# Patient Record
Sex: FEMALE | Race: WHITE | Employment: OTHER | URBAN - METROPOLITAN AREA
[De-identification: names, ages, dates, MRNs, and addresses within clinical notes are randomized per-mention and may not be internally consistent; named-entity substitution may affect disease eponyms.]

---

## 2017-05-15 ENCOUNTER — TELEPHONE (OUTPATIENT)
Dept: INTERNAL MEDICINE CLINIC | Age: 72
End: 2017-05-15

## 2017-09-29 ENCOUNTER — TELEPHONE (OUTPATIENT)
Dept: INTERNAL MEDICINE CLINIC | Age: 72
End: 2017-09-29

## 2017-09-29 DIAGNOSIS — M85.80 OSTEOPENIA DETERMINED BY X-RAY: ICD-10-CM

## 2017-09-29 DIAGNOSIS — E78.00 HYPERCHOLESTEROLEMIA: Primary | Chronic | ICD-10-CM

## 2017-10-09 DIAGNOSIS — M85.80 OSTEOPENIA DETERMINED BY X-RAY: ICD-10-CM

## 2017-10-09 DIAGNOSIS — E78.00 HYPERCHOLESTEROLEMIA: Chronic | ICD-10-CM

## 2017-10-09 LAB
A/G RATIO: 1.5 (ref 1.1–2.2)
ALBUMIN SERPL-MCNC: 4.1 G/DL (ref 3.4–5)
ALP BLD-CCNC: 125 U/L (ref 40–129)
ALT SERPL-CCNC: 31 U/L (ref 10–40)
ANION GAP SERPL CALCULATED.3IONS-SCNC: 16 MMOL/L (ref 3–16)
AST SERPL-CCNC: 20 U/L (ref 15–37)
BASOPHILS ABSOLUTE: 0.1 K/UL (ref 0–0.2)
BASOPHILS RELATIVE PERCENT: 0.8 %
BILIRUB SERPL-MCNC: 0.5 MG/DL (ref 0–1)
BUN BLDV-MCNC: 18 MG/DL (ref 7–20)
CALCIUM SERPL-MCNC: 9.8 MG/DL (ref 8.3–10.6)
CHLORIDE BLD-SCNC: 103 MMOL/L (ref 99–110)
CHOLESTEROL, TOTAL: 183 MG/DL (ref 0–199)
CO2: 23 MMOL/L (ref 21–32)
CREAT SERPL-MCNC: 0.7 MG/DL (ref 0.6–1.2)
EOSINOPHILS ABSOLUTE: 0.2 K/UL (ref 0–0.6)
EOSINOPHILS RELATIVE PERCENT: 2.8 %
GFR AFRICAN AMERICAN: >60
GFR NON-AFRICAN AMERICAN: >60
GLOBULIN: 2.7 G/DL
GLUCOSE BLD-MCNC: 90 MG/DL (ref 70–99)
HCT VFR BLD CALC: 44 % (ref 36–48)
HDLC SERPL-MCNC: 55 MG/DL (ref 40–60)
HEMOGLOBIN: 14.5 G/DL (ref 12–16)
LDL CHOLESTEROL CALCULATED: 96 MG/DL
LYMPHOCYTES ABSOLUTE: 1.8 K/UL (ref 1–5.1)
LYMPHOCYTES RELATIVE PERCENT: 25.2 %
MCH RBC QN AUTO: 29.4 PG (ref 26–34)
MCHC RBC AUTO-ENTMCNC: 33 G/DL (ref 31–36)
MCV RBC AUTO: 89.2 FL (ref 80–100)
MONOCYTES ABSOLUTE: 0.6 K/UL (ref 0–1.3)
MONOCYTES RELATIVE PERCENT: 8.4 %
NEUTROPHILS ABSOLUTE: 4.5 K/UL (ref 1.7–7.7)
NEUTROPHILS RELATIVE PERCENT: 62.8 %
PDW BLD-RTO: 14.4 % (ref 12.4–15.4)
PLATELET # BLD: 161 K/UL (ref 135–450)
PMV BLD AUTO: 10.9 FL (ref 5–10.5)
POTASSIUM SERPL-SCNC: 4.4 MMOL/L (ref 3.5–5.1)
RBC # BLD: 4.93 M/UL (ref 4–5.2)
SODIUM BLD-SCNC: 142 MMOL/L (ref 136–145)
TOTAL PROTEIN: 6.8 G/DL (ref 6.4–8.2)
TRIGL SERPL-MCNC: 160 MG/DL (ref 0–150)
TSH SERPL DL<=0.05 MIU/L-ACNC: 0.66 UIU/ML (ref 0.27–4.2)
VITAMIN D 25-HYDROXY: 30.4 NG/ML
VLDLC SERPL CALC-MCNC: 32 MG/DL
WBC # BLD: 7.2 K/UL (ref 4–11)

## 2017-10-12 ENCOUNTER — TELEPHONE (OUTPATIENT)
Dept: INTERNAL MEDICINE CLINIC | Age: 72
End: 2017-10-12

## 2017-10-12 ENCOUNTER — OFFICE VISIT (OUTPATIENT)
Dept: INTERNAL MEDICINE CLINIC | Age: 72
End: 2017-10-12

## 2017-10-12 VITALS
SYSTOLIC BLOOD PRESSURE: 130 MMHG | BODY MASS INDEX: 28.42 KG/M2 | HEIGHT: 63 IN | WEIGHT: 160.4 LBS | OXYGEN SATURATION: 97 % | DIASTOLIC BLOOD PRESSURE: 80 MMHG | TEMPERATURE: 98.1 F | HEART RATE: 79 BPM

## 2017-10-12 DIAGNOSIS — E78.00 HYPERCHOLESTEROLEMIA: Chronic | ICD-10-CM

## 2017-10-12 DIAGNOSIS — M85.89 OSTEOPENIA OF MULTIPLE SITES: ICD-10-CM

## 2017-10-12 DIAGNOSIS — R09.89 GLOBUS SENSATION: ICD-10-CM

## 2017-10-12 DIAGNOSIS — C50.911 MALIGNANT NEOPLASM OF RIGHT FEMALE BREAST, UNSPECIFIED SITE OF BREAST: Primary | ICD-10-CM

## 2017-10-12 DIAGNOSIS — Z23 NEED FOR PROPHYLACTIC VACCINATION WITH STREPTOCOCCUS PNEUMONIAE (PNEUMOCOCCUS) AND INFLUENZA VACCINES: ICD-10-CM

## 2017-10-12 PROCEDURE — 90732 PPSV23 VACC 2 YRS+ SUBQ/IM: CPT | Performed by: INTERNAL MEDICINE

## 2017-10-12 PROCEDURE — 99214 OFFICE O/P EST MOD 30 MIN: CPT | Performed by: INTERNAL MEDICINE

## 2017-10-12 PROCEDURE — G0009 ADMIN PNEUMOCOCCAL VACCINE: HCPCS | Performed by: INTERNAL MEDICINE

## 2017-10-12 ASSESSMENT — ENCOUNTER SYMPTOMS
WHEEZING: 0
STRIDOR: 0
VOMITING: 0
RESPIRATORY NEGATIVE: 1
HEARTBURN: 0
EYES NEGATIVE: 1
EYE REDNESS: 0
BLOOD IN STOOL: 0
EYE PAIN: 0
DOUBLE VISION: 0
SHORTNESS OF BREATH: 0
COUGH: 0
ORTHOPNEA: 0
BACK PAIN: 0
NAUSEA: 0
ABDOMINAL PAIN: 0
SORE THROAT: 0
DIARRHEA: 0
BLURRED VISION: 0
CONSTIPATION: 0
EYE DISCHARGE: 0
PHOTOPHOBIA: 0
HEMOPTYSIS: 0
SPUTUM PRODUCTION: 0

## 2017-10-12 ASSESSMENT — PATIENT HEALTH QUESTIONNAIRE - PHQ9
2. FEELING DOWN, DEPRESSED OR HOPELESS: 0
SUM OF ALL RESPONSES TO PHQ QUESTIONS 1-9: 0
SUM OF ALL RESPONSES TO PHQ9 QUESTIONS 1 & 2: 0
1. LITTLE INTEREST OR PLEASURE IN DOING THINGS: 0

## 2017-10-12 NOTE — PROGRESS NOTES
OUTPATIENT PROGRESS NOTE    Date of Service:  10/12/2017  Address: [address] [place] [address] INTERNAL MEDICINE  [address] [address]  Dept: [phone]    Subjective:      Patient ID: [id number]  Briana Cook is a 67 y.o. 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 Less so than last year     Had her colonoscopy with Dr. Tobias Santamaria and was having difficulty with the prep, advised next in 2022, will use zofran to premedicate     Has gotten pneumococcal vaccine 2009, never had prevnar. Had her zostavax since last visit as she was not allergic to neosporin. She vacationed to [place], [place] and [place] for vacation recently. Review of Systems   Constitutional: Negative. Negative for chills, diaphoresis, fever, malaise/fatigue and weight loss. HENT: Negative for congestion, ear discharge, ear pain, hearing loss, nosebleeds, sore throat and tinnitus. Eyes: Negative. Negative for blurred vision, double vision, photophobia, pain, discharge and redness. Respiratory: Negative. Negative for cough, hemoptysis, sputum production, shortness of breath, wheezing and stridor. Cardiovascular: Negative. Negative for chest pain, palpitations, orthopnea, claudication, leg swelling and PND. Gastrointestinal: Negative for abdominal pain, blood in stool, constipation, diarrhea, heartburn, melena, nausea and vomiting. Genitourinary: Negative. Negative for dysuria, flank pain, frequency, hematuria and urgency. Musculoskeletal: Negative. Negative for back pain, falls, joint pain, myalgias and neck pain. Skin: Negative. Negative for itching and rash.    Neurological: Negative for dizziness, tingling, distal pulses. Exam reveals no gallop and no friction rub. No murmur heard. Pulmonary/Chest: Effort normal and breath sounds normal. No stridor. No respiratory distress. She has no wheezes. She has no rales. She exhibits no tenderness. Abdominal: Soft. Bowel sounds are normal. She exhibits no distension and no mass. There is tenderness in the epigastric area. There is no rebound and no guarding. Musculoskeletal: Normal range of motion. She exhibits no edema or tenderness. Lymphadenopathy:     She has no cervical adenopathy. Neurological: She is oriented to person, place, and time. She has normal reflexes. She appears not lethargic. No cranial nerve deficit. She exhibits normal muscle tone. Gait normal. Coordination normal.   Skin: Skin is warm and dry. No rash noted. She is not diaphoretic. No erythema. No pallor. Psychiatric: Mood, memory, affect and judgment normal.   Nursing note and vitals reviewed. Assessment/Plan:      Encounter Diagnoses   Name Primary?  Malignant neoplasm of right female breast, unspecified site of breast ([facility] [address] Yes    Hypercholesterolemia     Osteopenia of multiple sites     Globus sensation        1. Malignant neoplasm of right female breast, unspecified site of breast ([address]  Normal mammogram. 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 Hypercholesterolemia  Ongoing, on Lipitor 10mg every other day, recent lipids great. Will repeat in a year. ASCVD calculation per results     3. Elevation of level of transaminase or lactic acid dehydrogenase (LDH)  Previously noted, likely ALP elevated due to bone demineralization    4. Osteopenia  Needs updated DEXA in february and vitamin d was normal for post-menopausal estrogen deficient osteopenia. Noted with elevated ALP previously    5.  Nasal congestion  Likely allergic vs non-allergic rhinitis, will trial nasal CS and try ipratropium nasal if no improvement          Additional Orders:      Orders Placed This Encounter   Procedures    CANCER ANTIGEN 27.29     Standing Status:   Future     Standing Expiration Date:   10/12/2018     No orders of the defined types were placed in this encounter. DISPOSITION:      No Follow-up on file.   1. Greater than 45 minutes spent with patient and significant other and >20 minutes on medication dosing, use and lifestyle modifications, home safety    Kang You MD

## 2017-10-13 DIAGNOSIS — C50.911 MALIGNANT NEOPLASM OF RIGHT FEMALE BREAST, UNSPECIFIED SITE OF BREAST: ICD-10-CM

## 2017-10-18 LAB — CA 27-29: 12 U/ML (ref 0–38)

## 2018-01-25 DIAGNOSIS — E78.00 HYPERCHOLESTEROLEMIA: Chronic | ICD-10-CM

## 2018-01-25 RX ORDER — ATORVASTATIN CALCIUM 10 MG/1
10 TABLET, FILM COATED ORAL EVERY OTHER DAY
Qty: 45 TABLET | Refills: 0 | Status: SHIPPED | OUTPATIENT
Start: 2018-01-25 | End: 2018-05-11 | Stop reason: SDUPTHER

## 2018-05-11 ENCOUNTER — OFFICE VISIT (OUTPATIENT)
Dept: INTERNAL MEDICINE CLINIC | Age: 73
End: 2018-05-11

## 2018-05-11 VITALS
BODY MASS INDEX: 26.87 KG/M2 | WEIGHT: 157.4 LBS | HEART RATE: 81 BPM | SYSTOLIC BLOOD PRESSURE: 134 MMHG | HEIGHT: 64 IN | TEMPERATURE: 97.9 F | DIASTOLIC BLOOD PRESSURE: 80 MMHG | OXYGEN SATURATION: 96 %

## 2018-05-11 DIAGNOSIS — E78.00 HYPERCHOLESTEROLEMIA: Primary | Chronic | ICD-10-CM

## 2018-05-11 DIAGNOSIS — Z00.00 ANNUAL PHYSICAL EXAM: ICD-10-CM

## 2018-05-11 DIAGNOSIS — C50.011 MALIGNANT NEOPLASM OF NIPPLE OF RIGHT BREAST IN FEMALE, UNSPECIFIED ESTROGEN RECEPTOR STATUS (HCC): ICD-10-CM

## 2018-05-11 DIAGNOSIS — E55.9 VITAMIN D DEFICIENCY: ICD-10-CM

## 2018-05-11 DIAGNOSIS — M85.80 OSTEOPENIA, UNSPECIFIED LOCATION: ICD-10-CM

## 2018-05-11 PROCEDURE — 4040F PNEUMOC VAC/ADMIN/RCVD: CPT | Performed by: INTERNAL MEDICINE

## 2018-05-11 PROCEDURE — G8427 DOCREV CUR MEDS BY ELIG CLIN: HCPCS | Performed by: INTERNAL MEDICINE

## 2018-05-11 PROCEDURE — 3017F COLORECTAL CA SCREEN DOC REV: CPT | Performed by: INTERNAL MEDICINE

## 2018-05-11 PROCEDURE — G8419 CALC BMI OUT NRM PARAM NOF/U: HCPCS | Performed by: INTERNAL MEDICINE

## 2018-05-11 PROCEDURE — G8399 PT W/DXA RESULTS DOCUMENT: HCPCS | Performed by: INTERNAL MEDICINE

## 2018-05-11 PROCEDURE — 3014F SCREEN MAMMO DOC REV: CPT | Performed by: INTERNAL MEDICINE

## 2018-05-11 PROCEDURE — 1036F TOBACCO NON-USER: CPT | Performed by: INTERNAL MEDICINE

## 2018-05-11 PROCEDURE — 1123F ACP DISCUSS/DSCN MKR DOCD: CPT | Performed by: INTERNAL MEDICINE

## 2018-05-11 PROCEDURE — 1090F PRES/ABSN URINE INCON ASSESS: CPT | Performed by: INTERNAL MEDICINE

## 2018-05-11 PROCEDURE — 99214 OFFICE O/P EST MOD 30 MIN: CPT | Performed by: INTERNAL MEDICINE

## 2018-05-11 RX ORDER — ATORVASTATIN CALCIUM 10 MG/1
10 TABLET, FILM COATED ORAL EVERY OTHER DAY
Qty: 45 TABLET | Refills: 3 | Status: SHIPPED | OUTPATIENT
Start: 2018-05-11 | End: 2018-11-13 | Stop reason: SDUPTHER

## 2018-11-07 DIAGNOSIS — C50.011 MALIGNANT NEOPLASM OF NIPPLE OF RIGHT BREAST IN FEMALE, UNSPECIFIED ESTROGEN RECEPTOR STATUS (HCC): ICD-10-CM

## 2018-11-07 DIAGNOSIS — E55.9 VITAMIN D DEFICIENCY: ICD-10-CM

## 2018-11-07 DIAGNOSIS — Z00.00 ANNUAL PHYSICAL EXAM: ICD-10-CM

## 2018-11-07 LAB
A/G RATIO: 1.8 (ref 1.1–2.2)
ALBUMIN SERPL-MCNC: 4.5 G/DL (ref 3.4–5)
ALP BLD-CCNC: 125 U/L (ref 40–129)
ALT SERPL-CCNC: 25 U/L (ref 10–40)
ANION GAP SERPL CALCULATED.3IONS-SCNC: 13 MMOL/L (ref 3–16)
AST SERPL-CCNC: 24 U/L (ref 15–37)
BASOPHILS ABSOLUTE: 0 K/UL (ref 0–0.2)
BASOPHILS RELATIVE PERCENT: 0.7 %
BILIRUB SERPL-MCNC: 0.5 MG/DL (ref 0–1)
BUN BLDV-MCNC: 18 MG/DL (ref 7–20)
CALCIUM SERPL-MCNC: 9.6 MG/DL (ref 8.3–10.6)
CHLORIDE BLD-SCNC: 104 MMOL/L (ref 99–110)
CHOLESTEROL, TOTAL: 180 MG/DL (ref 0–199)
CO2: 26 MMOL/L (ref 21–32)
CREAT SERPL-MCNC: 0.7 MG/DL (ref 0.6–1.2)
EOSINOPHILS ABSOLUTE: 0.2 K/UL (ref 0–0.6)
EOSINOPHILS RELATIVE PERCENT: 2.5 %
GFR AFRICAN AMERICAN: >60
GFR NON-AFRICAN AMERICAN: >60
GLOBULIN: 2.5 G/DL
GLUCOSE BLD-MCNC: 89 MG/DL (ref 70–99)
HCT VFR BLD CALC: 44.7 % (ref 36–48)
HDLC SERPL-MCNC: 56 MG/DL (ref 40–60)
HEMOGLOBIN: 14.7 G/DL (ref 12–16)
LDL CHOLESTEROL CALCULATED: 98 MG/DL
LYMPHOCYTES ABSOLUTE: 1.9 K/UL (ref 1–5.1)
LYMPHOCYTES RELATIVE PERCENT: 30.1 %
MCH RBC QN AUTO: 29.6 PG (ref 26–34)
MCHC RBC AUTO-ENTMCNC: 32.9 G/DL (ref 31–36)
MCV RBC AUTO: 89.9 FL (ref 80–100)
MONOCYTES ABSOLUTE: 0.5 K/UL (ref 0–1.3)
MONOCYTES RELATIVE PERCENT: 8.4 %
NEUTROPHILS ABSOLUTE: 3.6 K/UL (ref 1.7–7.7)
NEUTROPHILS RELATIVE PERCENT: 58.3 %
PDW BLD-RTO: 14.1 % (ref 12.4–15.4)
PLATELET # BLD: 195 K/UL (ref 135–450)
PMV BLD AUTO: 10.5 FL (ref 5–10.5)
POTASSIUM SERPL-SCNC: 4.4 MMOL/L (ref 3.5–5.1)
RBC # BLD: 4.98 M/UL (ref 4–5.2)
SODIUM BLD-SCNC: 143 MMOL/L (ref 136–145)
TOTAL PROTEIN: 7 G/DL (ref 6.4–8.2)
TRIGL SERPL-MCNC: 129 MG/DL (ref 0–150)
TSH SERPL DL<=0.05 MIU/L-ACNC: 0.9 UIU/ML (ref 0.27–4.2)
VITAMIN B-12: 467 PG/ML (ref 211–911)
VITAMIN D 25-HYDROXY: 37.8 NG/ML
VLDLC SERPL CALC-MCNC: 26 MG/DL
WBC # BLD: 6.2 K/UL (ref 4–11)

## 2018-11-14 ENCOUNTER — TELEPHONE (OUTPATIENT)
Dept: FAMILY MEDICINE CLINIC | Age: 73
End: 2018-11-14

## 2018-11-14 LAB — CA 27-29: 7 U/ML (ref 0–38)

## 2018-11-15 NOTE — TELEPHONE ENCOUNTER
PA RESPONSE: Diclofenac Sodium 1% TD GEL  HAS BEEN APPROVED  GOOD UNTIL 12/31/2019
PA SUBMITTED VIA CM FOR Diclofenac Sodium 1% TD GEL  Key: [id number] - PA Case ID: [id number]   STATUS: PENDING
English

## 2019-02-12 DIAGNOSIS — E78.00 HYPERCHOLESTEROLEMIA: Chronic | ICD-10-CM

## 2019-02-12 LAB
ALBUMIN SERPL-MCNC: 4 G/DL (ref 3.4–5)
ALP BLD-CCNC: 128 U/L (ref 40–129)
ALT SERPL-CCNC: 18 U/L (ref 10–40)
AST SERPL-CCNC: 17 U/L (ref 15–37)
BILIRUB SERPL-MCNC: 0.6 MG/DL (ref 0–1)
BILIRUBIN DIRECT: <0.2 MG/DL (ref 0–0.3)
BILIRUBIN, INDIRECT: NORMAL MG/DL (ref 0–1)
CHOLESTEROL, TOTAL: 154 MG/DL (ref 0–199)
HDLC SERPL-MCNC: 57 MG/DL (ref 40–60)
LDL CHOLESTEROL CALCULATED: 68 MG/DL
TOTAL PROTEIN: 6.9 G/DL (ref 6.4–8.2)
TRIGL SERPL-MCNC: 147 MG/DL (ref 0–150)
VLDLC SERPL CALC-MCNC: 29 MG/DL

## 2019-11-07 ENCOUNTER — TELEPHONE (OUTPATIENT)
Dept: PHARMACY | Facility: CLINIC | Age: 74
End: 2019-11-07

## 2020-04-05 PROBLEM — Z85.828 HISTORY OF NONMELANOMA SKIN CANCER: Status: ACTIVE | Noted: 2019-01-11

## 2020-06-11 ENCOUNTER — HOSPITAL ENCOUNTER (OUTPATIENT)
Dept: WOMENS IMAGING | Age: 75
Discharge: HOME | End: 2020-06-11
Payer: MEDICARE

## 2020-06-11 PROCEDURE — 77080 DXA BONE DENSITY AXIAL: CPT

## 2020-06-25 ENCOUNTER — HOSPITAL ENCOUNTER (OUTPATIENT)
Dept: WOMENS IMAGING | Age: 75
Discharge: HOME | End: 2020-06-25
Payer: MEDICARE

## 2020-06-25 PROCEDURE — 77063 BREAST TOMOSYNTHESIS BI: CPT

## 2020-11-09 PROBLEM — R07.0 CHRONIC THROAT PAIN: Status: ACTIVE | Noted: 2020-01-01

## 2020-11-09 PROBLEM — G89.29 CHRONIC THROAT PAIN: Status: ACTIVE | Noted: 2020-01-01

## 2021-09-02 ENCOUNTER — HOSPITAL ENCOUNTER (OUTPATIENT)
Dept: WOMENS IMAGING | Age: 76
Discharge: HOME | End: 2021-09-02
Payer: MEDICARE

## 2021-09-02 DIAGNOSIS — Z12.31 VISIT FOR SCREENING MAMMOGRAM: ICD-10-CM

## 2021-09-02 PROCEDURE — 77063 BREAST TOMOSYNTHESIS BI: CPT

## 2022-04-01 DIAGNOSIS — H91.90 HEARING LOSS, UNSPECIFIED HEARING LOSS TYPE, UNSPECIFIED LATERALITY: Primary | ICD-10-CM

## 2022-04-05 ENCOUNTER — PROCEDURE VISIT (OUTPATIENT)
Dept: AUDIOLOGY | Age: 77
End: 2022-04-05
Payer: MEDICARE

## 2022-04-05 ENCOUNTER — OFFICE VISIT (OUTPATIENT)
Dept: ENT CLINIC | Age: 77
End: 2022-04-05
Payer: MEDICARE

## 2022-04-05 VITALS
BODY MASS INDEX: 25.78 KG/M2 | HEIGHT: 64 IN | WEIGHT: 151 LBS | DIASTOLIC BLOOD PRESSURE: 94 MMHG | HEART RATE: 81 BPM | SYSTOLIC BLOOD PRESSURE: 165 MMHG

## 2022-04-05 DIAGNOSIS — H93.13 TINNITUS OF BOTH EARS: ICD-10-CM

## 2022-04-05 DIAGNOSIS — H90.3 SENSORINEURAL HEARING LOSS (SNHL) OF BOTH EARS: Primary | ICD-10-CM

## 2022-04-05 DIAGNOSIS — R49.0 DYSPHONIA: ICD-10-CM

## 2022-04-05 DIAGNOSIS — H93.8X3 SENSATION OF FULLNESS IN BOTH EARS: ICD-10-CM

## 2022-04-05 DIAGNOSIS — R04.0 EPISTAXIS: ICD-10-CM

## 2022-04-05 DIAGNOSIS — H69.82 DYSFUNCTION OF LEFT EUSTACHIAN TUBE: ICD-10-CM

## 2022-04-05 PROCEDURE — 1123F ACP DISCUSS/DSCN MKR DOCD: CPT | Performed by: STUDENT IN AN ORGANIZED HEALTH CARE EDUCATION/TRAINING PROGRAM

## 2022-04-05 PROCEDURE — G8427 DOCREV CUR MEDS BY ELIG CLIN: HCPCS | Performed by: STUDENT IN AN ORGANIZED HEALTH CARE EDUCATION/TRAINING PROGRAM

## 2022-04-05 PROCEDURE — 92504 EAR MICROSCOPY EXAMINATION: CPT | Performed by: STUDENT IN AN ORGANIZED HEALTH CARE EDUCATION/TRAINING PROGRAM

## 2022-04-05 PROCEDURE — 99204 OFFICE O/P NEW MOD 45 MIN: CPT | Performed by: STUDENT IN AN ORGANIZED HEALTH CARE EDUCATION/TRAINING PROGRAM

## 2022-04-05 PROCEDURE — 4040F PNEUMOC VAC/ADMIN/RCVD: CPT | Performed by: STUDENT IN AN ORGANIZED HEALTH CARE EDUCATION/TRAINING PROGRAM

## 2022-04-05 PROCEDURE — G8417 CALC BMI ABV UP PARAM F/U: HCPCS | Performed by: STUDENT IN AN ORGANIZED HEALTH CARE EDUCATION/TRAINING PROGRAM

## 2022-04-05 PROCEDURE — 92557 COMPREHENSIVE HEARING TEST: CPT | Performed by: AUDIOLOGIST

## 2022-04-05 PROCEDURE — 1036F TOBACCO NON-USER: CPT | Performed by: STUDENT IN AN ORGANIZED HEALTH CARE EDUCATION/TRAINING PROGRAM

## 2022-04-05 PROCEDURE — 92567 TYMPANOMETRY: CPT | Performed by: AUDIOLOGIST

## 2022-04-05 PROCEDURE — 1090F PRES/ABSN URINE INCON ASSESS: CPT | Performed by: STUDENT IN AN ORGANIZED HEALTH CARE EDUCATION/TRAINING PROGRAM

## 2022-04-05 PROCEDURE — G8399 PT W/DXA RESULTS DOCUMENT: HCPCS | Performed by: STUDENT IN AN ORGANIZED HEALTH CARE EDUCATION/TRAINING PROGRAM

## 2022-04-05 NOTE — Clinical Note
Dr. Leonides Gonzales,    Please see note from this patient's audiogram from today. Please let me know if there is anything further you need.         Obed Andujar  Audiologist

## 2022-04-05 NOTE — PATIENT INSTRUCTIONS
Good Communication Strategies    Communication can be challenging for anyone, but can be especially difficult for those with some degree of hearing loss. While we may not be able to control every factor that may lead to difficulty with communication, there are Good Communication Strategies that we can all use in our day-to-day lives. Communication takes both parties working together for it to be successful. Tips as a Listener:   1. Control your environment. It is important to limit the amount of background noise in the room when possible. You should also consider having a good light source in the room to best see the other person. 2. Ask for clarification. Instead of saying \"What?\", you can use parts of what you heard to make a new question. For example, if you heard the word \"Thursday\" but not the rest of the week, you may ask \"What was that about Thursday? \" or \"What did you want to do Thursday? \". This shows the person talking that you are listening and will help them better explain what they are saying. 3. Be an advocate for yourself. If you are hearing but not understanding, tell the other person \"I can hear you, but I need you to slow down when you speak. \"  Or if someone is facing the other direction, say \"I cannot hear you when you are not looking at me when we talk. \"       Tips as a Talker:   - Sit or stand 3 to 6 feet away to maximize audibility         -- It is unrealistic to believe someone else will fully hear your message if you are speaking from across the room or in a different room in the house   - Stay at eye level to help with visual cues   - Make sure you have the persons attention before speaking   - Use facial expressions and gestures to accentuate your message   - Raise your voice slightly (do not scream)   - Speak slowly and distinctly   - Use short, simple sentences   - Rephrase your words if the person is having a hard time understanding you    - To avoid distortion, dont speak directly into a persons ear      Some additional items that may be helpful:   - Remain patient - this is important for both parties   - Write down items that still cannot be heard/understood. You may write with pen/paper or consider typing/texting on a cell phone or smart device. - If background noise is unavoidable, try to keep yourself in a good position in the room. By sitting at a cool on the side of the restaurant (preferably a corner), it will be easier to communicate than if you were sitting at a table in the middle with background noise surrounding you. Keep yourself positioned away from music speakers or heavy foot traffic. Hearing Loss: Care Instructions  Your Care Instructions      Hearing loss is a sudden or slow decrease in how well you hear. It can range from mild to profound. Permanent hearing loss can occur with aging, and it can happen when you are exposed long-term to loud noise. Examples include listening to loud music, riding motorcycles, or being around other loud machines. Hearing loss can affect your work and home life. It can make you feel lonely or depressed. You may feel that you have lost your independence. But hearing aids and other devices can help you hear better and feel connected to others. Follow-up care is a key part of your treatment and safety. Be sure to make and go to all appointments, and call your doctor if you are having problems. It's also a good idea to know your test results and keep a list of the medicines you take. How can you care for yourself at home? · Avoid loud noises whenever possible. This helps keep your hearing from getting worse. Always wear hearing protection around loud noises. · If appropriate, wear hearing aid(s) as directed. It is recommended that hearing aids are worn during all waking hours to keep your brain active and give it access to the sounds it is missing.       · If you are beginning your process with hearing aid(s), schedule a \"Hearing Aid Evaluation\" with an audiologist to discuss your lifestyle, features of hearing aid technology, and styles of hearing aids available. It is recommended that you contact your insurance company to determine if you have a hearing aid benefit, as this may dictate who you can see for these services. · Have hearing tests as your doctor suggests. They can show whether your hearing has changed. Your hearing aid may need to be adjusted. · Use other assistive devices as needed. These may include:  ? Telephone amplifiers and hearing aids that can connect to a television, stereo, radio, or microphone. ? Devices that use lights or vibrations. These alert you to the doorbell, a ringing telephone, or a baby monitor. ? Television closed-captioning. This shows the words at the bottom of the screen. Most new TVs can do this. ? TTY (text telephone). This lets you type messages back and forth on the telephone instead of talking or listening. These devices are also called TDD. When messages are typed on the keyboard, they are sent over the phone line to a receiving TTY. The message is shown on a monitor. · Use pagers, fax machines, text, and email if it is hard for you to communicate by telephone. · Try to learn a listening technique called speech-reading. It is not lip-reading. You pay attention to people's gestures, expressions, posture, and tone of voice. These clues can help you understand what a person is saying. Face the person you are talking to, and have him or her face you. Make sure the lighting is good. You need to see the other person's face clearly. · Think about counseling if you need help to adjust to your hearing loss. When should you call for help? Watch closely for changes in your health, and be sure to contact your doctor if:    · You think your hearing is getting worse. · You have new symptoms, such as dizziness or nausea.            Tinnitus: Overview and Management Strategies          Many people have some ringing sounds in their ears once in a while. You may hear a roar, a hiss, a tinkle, or a buzz. The sound usually lasts only a few minutes. If it goes on all the time, you may have tinnitus. Tinnitus is usually caused by long-term exposure to loud noise. This damages the nerves in the inner ear. It can occur with all types of hearing loss. It may be a symptom of almost any ear problem. Tinnitus may be caused by a buildup of earwax. Or, it may be caused by ear infections or certain medicines (especially antibiotics or large amounts of aspirin). You can also hear noises in your ears because of an injury to the ears, drinking too much alcohol or caffeine, or a medical condition. Other conditions may also contribute to tinnitus, including: head and neck trauma, temporomandibular joint disorder (TMJ), sinus pressure and barometric trauma, traumatic brain injury, metabolic disorders, autoimmune disorders, stress, and high blood pressure. You may need tests to evaluate your hearing and to find causes of long-lasting tinnitus. Your doctor may suggest one or more treatments to help you cope with the tinnitus. You can also do things at home to help reduce symptoms. Causes of Tinnitus  We do not know the exact cause of tinnitus. One thing we do know is that you are not imagining it. If you have tinnitus, chances are the cause will remain a mystery. Conditions that might cause tinnitus include the following:    Hearing loss    Ménière's disease    Loud noise exposure    Migraines    Head injury    Drugs or medicines that are toxic to hearing    Anemia    High blood pressure    Stress    A lot of wax in the ear    Certain types of tumors    Having a lot of caffeine    Smoking cigarettes  You may find that your tinnitus is worse at night. This happens because it is quiet and you are not distracted.  Feeling tired and stressed may make your tinnitus worse.    Follow-up care is a key part of your treatment and safety. Be sure to make and go to all appointments, and call your doctor if you are having problems. It's also a good idea to know your test results and keep a list of the medicines you take. How can you care for yourself at home? · Limit or cut out alcohol, caffeine, and sodium. They can make your symptoms worse. · Do not smoke or use other tobacco products. Nicotine reduces blood flow to the ear and makes tinnitus worse. If you need help quitting, talk to your doctor about stop-smoking programs and medicines. These can increase your chances of quitting for good. · Talk to your doctor about whether to stop taking aspirin and similar products such as ibuprofen or naproxen. · Get exercise often. It can help improve blood flow to the ear. Hearing Aids and Other Devices  A hearing aid may help your tinnitus if you have a hearing loss. An audiologist can help you find and use the best hearing aid for you. Tinnitus maskers look like hearing aids. They make a sound that masks, or covers up, the tinnitus. The masking sound distracts you from the ringing in your ears. You may be able to use a masker and a hearing aid at the same time. Sound machines can be useful at night or during quiet times. There are machines you can buy at the store. Or, you can find apps on your phone that make sounds, like the ocean or rainfall. Fish tanks, fans, quiet music, and indoor waterfalls can help, as well. Ways to manage/cope with tinnitus  Some tinnitus may last a long time. To manage your tinnitus, try to:  · Avoid noises that you think caused your tinnitus. If you can't avoid loud noises, wear earplugs or earmuffs. · Ignore the sound by paying attention to other things. Keeping your brain busy with other tasks or background noise can help your brain not focus on the tinnitus. · Try to not give the tinnitus an emotional reaction.   Do your best to ignore the sound and not let it bother you. Relax using biofeedback, meditation, or yoga. Feeling stressed and being tired can make tinnitus worse. · Play music or white noise to help you sleep. Background noise may cover up the noise that you hear in your ears. You can buy a tabletop machine or a device that sits under your pillow to play soothing sounds, like ocean waves. · Smart phones have free apps, such as Whist, Relax Melodies, ReSound Relief, and White Noise Lite. These apps have different types of sounds/noise, some of which you can blend together to find sounds that are most soothing to you. · Hearing aid technology, especially when there is some hearing loss, may help reduce tinnitus symptoms by giving your brain better access to the sounds it is missing. There are some hearing aids with built-in noise generator programs, which may help when amplification alone is not enough. Additional resources may be found through the [employer] Tinn[employer] Association at www.nidia.org    When should you call for help? Call 911 anytime you think you may need emergency care. For example, call if:    · You have symptoms of a stroke. These may include:  ? Sudden numbness, tingling, weakness, or loss of movement in your face, arm, or leg, especially on only one side of your body. ? Sudden vision changes. ? Sudden trouble speaking. ? Sudden confusion or trouble understanding simple statements. ? Sudden problems with walking or balance. ? A sudden, severe headache that is different from past headaches. Call your doctor now or seek immediate medical care if:    · You develop other symptoms. These may include hearing loss (or worse hearing loss), balance problems, dizziness, nausea, or vomiting. Watch closely for changes in your health, and be sure to contact your doctor if:    · Your tinnitus moves from both ears to one ear. · Your hearing loss gets worse within 1 day after an ear injury.      · Your tinnitus or hearing loss does not get better within 1 week after an ear injury. · Your tinnitus bothers you enough that you want to take medicines to help you cope with it. If you notice changes in your tinnitus and/or your hearing, it is recommended that you have your hearing tested by your audiologist and to follow-up with your physician that manages your hearing loss (such as your ENT or Primary Care doctor). Learning About Hearing Aids  What is a hearing aid? A hearing aid makes sounds louder. It can help some people with hearing problems to hear better. Hearing aids do not restore normal hearing. But they can make it easier to communicate by making sounds clearer. · Digital programmable hearing aids can adjust themselves to work best where you are at any time. You also have more choices in setting them up than with analog hearing aids. There are also different styles of hearing aids. · A behind-the-ear (BTE) hearing aid connects to a plastic ear mold that fits inside the outer ear. BTE hearing aids are used for all levels of hearing loss, especially very severe hearing loss. They may be better for children for safety and growth reasons. Poorly fitting BTE ear molds or a buildup of earwax may cause a whistling sound (feedback). · An in-the-ear (ITE) hearing aid fits in the outer part of the ear. It can be used by people with mild to severe hearing loss. ITE hearing aids can be used with other hearing devices, such as a telecoil that improves hearing during phone calls. ITE hearing aids can be damaged by earwax and fluid draining from the ear. Their small size may be hard for some people to handle. They are not often used in children because the case must be replaced as the child grows. · An in-the-canal (ITC) hearing aid fits into the ear canal. ITC hearing aids are used by people with mild to moderate hearing loss.  They are made to fit the shape and the size of your ear canal. They can be damaged by earwax and fluid draining from the ear. Their small size may be hard for some people to handle. They are not made for children. You have some options if you have a hearing problem and are thinking about getting hearing aids. You can go to your doctor or an audiologist. He or she will do a hearing test and help you decide which type and style of hearing aid may be best for you. What else should I know about hearing aids? Find out if your insurance covers hearing aids. They can be expensive. Different types of hearing aids come with different costs. Also find out about a warranty or return policy in case you are not happy with your hearing aids. Follow-up care is a key part of your treatment and safety. Be sure to make and go to all appointments, and call your doctor if you are having problems. It's also a good idea to know your test results and keep a list of the medicines you take. Where can you learn more? Go to https://Voradius.99designs. org and sign in to your [employer] account. Enter A084 in the [facility] box to learn more about \"Learning About Hearing Aids. \"     If you do not have an account, please click on the \"Sign Up Now\" link. Current as of: October 21, 2018  Content Version: 12.1  © 3171-6365 [employer]. Care instructions adapted under license by [facility] ([facility]). If you have questions about a medical condition or this instruction, always ask your healthcare professional. [place] [address] any warranty or liability for your use of this information. If you are interested in pursuing hearing aids, here are the next steps:    1) Contact your insurance and ask, Do I have a benefit for hearing aids?    If the answer is no hearing aids will be a 100% out of pocket expense   If the answer is yes, ask Can I use this benefit at [facility] ([facility])?  or Where can I use this benefit?  If [facility] is not in-network for hearing aids, your insurance should be able to provide the appropriate contact information for an in-network provider. 2) Call [facility] ([phone]) to schedule a Hearing Aid Evaluation    This appointment is when we will discuss hearing aid options and decide which device is most appropriate for you.

## 2022-04-05 NOTE — PROGRESS NOTES
[facility] NECK SURGERY  CONSULT      Tamika Martin (:  1945) is a 65 y.o. female, here for evaluation of the following chief complaint(s):  Hearing Problem (Pt states Lt ear is really muffled. Pt also says it feels like there is fluid in the ear.)      ASSESSMENT/PLAN:  1. Sensorineural hearing loss (SNHL) of both ears  2. Sensation of fullness in both ears  3. Dysfunction of left eustachian tube  4. Epistaxis  5. Tinnitus of both ears  6. Dysphonia      This is a very pleasant 65 y.o. female here today for evaluation of the the above-noted complaints. I independently reviewed the patient's audiogram and shows evidence of normal sloping to moderate sensorineural hearing loss with type a tympanograms and excellent word production scores. We discussed the relationship tween tinnitus and hearing loss in detail. We also discussed tinnitus masking techniques for times when it is particularly bothersome. Regarding the patient's ear fullness, we discussed potential causes including eustachian tube dysfunction. We discussed that most often this will resolve on its own. We discussed that it would be reasonable to trial nasal steroid spray in the morning and evening as this can often provide symptomatic relief for patients. We discussed she could also try something such as Sudafed or an oral antihistamine. At the patient does not experience improvement and we discussed procedures such as an ear tube or loading of the tympanic membrane to see if there is a patulous component to this. Discussed that she needs to be careful when using the nasal steroid sprays. Discussed that if used improperly this can exacerbate her epistaxis. Asked her to use nasal saline sprays in her bilateral naris to prevent this in the future. Regarding her voice complaints, she previously was treated by a speech therapist for this.   If her symptoms return and we discussed that we could always refer her to our colleague in speech and language pathology here at [address]: The following items were considered in medical decision making:  Independent review of images  Review / order clinical lab tests  Review / order radiology tests  Decision to obtain old records  Review and summation of old records as accessed through [facility] if applicable    SUBJECTIVE/OBJECTIVE:  HPI    Francesca Rivers is here today for evaluation of issues related to ears. This is been going on for approximately 6 weeks maybe longer. She feels like she had a teeny noise in her left ear. She used Afrin for several days and this seemed to help her symptoms but then they ultimately returned. She feels like her left ear is clogged. Her right ear feels okay. She was seen by her primary care provider and had her ears irrigated and this did not improve the fullness. Patient states that this is new for her. Other than the Afrin nothing is helped. She had an audiogram.  She gets ringing in her ears which she describes as a low pitched hissing noise. Patient states that she has been getting some light epistaxis from the left nostril. Patient was previously treated by speech therapist in the past at the [facility]. Her dysphonia has since improved. REVIEW OF SYSTEMS  The following systems were reviewed and revealed the following in addition to any already discussed in the HPI:    PHYSICAL EXAM    GENERAL: No acute distress, alert and oriented, no hoarseness, strong voice  EYES: EOMI, Anti-icteric  HENT:   Head: Normocephalic and atraumatic.    Face:  Symmetric, facial nerve intact  Right Ear: Normal external ear, normal external auditory canal, intact tympanic membrane with normal mobility and aerated middle ear  Left Ear: Normal external ear, normal external auditory canal, intact tympanic membrane with normal mobility and aerated middle ear  Mouth/Oral Cavity: normal lips, normal dentition, normal salivary quality/flow  Oropharynx/Larynx:  normal oropharynx,   Nose:Normal external nasal appearance. PROCEDURE  Binocular otoscopic exam CPT 67830: The right ear was examined with the binocular otoscope. The external auditory canal was normal.  The tympanic membrane was intact with an aerated middle ear. The left ear was then examined. The external auditory canal was normal.  The tympanic membrane was intact with an aerated middle ear. Is evidence of some mild retraction with otoscopic examination. This note was generated completely or in part utilizing Dragon dictation speech recognition software. Occasionally, words are mistranscribed and despite editing, the text may contain inaccuracies due to incorrect word recognition. If further clarification is needed please contact the office at [phone]. An electronic signature was used to authenticate this note.     --Dennis Boeck, MD

## 2022-04-05 NOTE — PROGRESS NOTES
Roberto Sellers   1945, 65 y.o. female   [id number]       Referring Provider: Luis Narayanan MD  Referral Type: In an order in [address] [place] [address]    Reason for Visit: Evaluation of suspected change in hearing, tinnitus, or balance. ADULT AUDIOLOGIC EVALUATION      Roberto Sellers is a 65 y.o. female seen today, 4/5/2022 , for an initial audiologic evaluation. Patient was seen by Luis Narayanan MD following today's evaluation. AUDIOLOGIC AND OTHER PERTINENT MEDICAL HISTORY:      Roberto Sellers noted aural fullness and tinnitus. Patient reports muffled hearing following an ear irrigation about 2 weeks ago. She notes her hearing seems better today but still not normal. Patient also notes a sensation of fullness in the ears and intermittent bilateral tinnitus. Roberto Sellers denied otalgia, otorrhea, dizziness, imbalance, history of falls, history of signficant noise exposure, history of head trauma, history of ear surgery and family history of hearing loss. Date: 4/5/2022     IMPRESSIONS:      AU: Hearing WNL sloping to Moderate SNHL, Excellent WRS, Type A tymp    Test results consistent with bilateral Sensorineural hearing loss. Hearing loss significant enough to create hearing difficulty in some listening situations. Discussed hearing loss, tinnitus and hearing aids with patient. Patient to follow medical recommendations per Luis Narayanan MD .    ASSESSMENT AND FINDINGS:     Otoscopy revealed: Clear ear canals bilaterally    RIGHT EAR:  Hearing Sensitivity: Normal hearing sensitivity to  Moderate   Sensorineural hearing loss  Speech Recognition Threshold: 15 dB HL  Word Recognition:Excellent (100%), based on NU-6 25-word list at 65m dBHL using recorded speech stimuli. Tympanometry: Normal peak pressure and compliance, Type A tympanogram, consistent with normal middle ear function.   Acoustic Reflexes: Ipsilateral: Could not maintain seal. Contralateral: Could not maintain seal.    LEFT EAR:  Hearing Sensitivity: Normal hearing sensitivity to  Moderate    Sensorineural hearing loss  Speech Recognition Threshold: 20 dB HL  Word Recognition:Excellent (100%), based on NU-6 25-word list at 70m dBHL using recorded speech stimuli. Tympanometry: Normal peak pressure and compliance, Type A tympanogram, consistent with normal middle ear function. Acoustic Reflexes: Ipsilateral: Could not maintain seal. Contralateral: Could not maintain seal.    Reliability: Good  Transducer: HF Headphones    See scanned audiogram dated 4/5/2022  for results. PATIENT EDUCATION:     The following items were discussed with the patient:   - Good Communication Strategies  - Hearing Loss and Hearing Aids  - Tinnitus Management Strategies      Educational information was shared in the After Visit Summary. RECOMMENDATIONS:                                                                                                                                                                                                                                                          The following items are recommended based on patient report and results from today's appointment:   - Continue medical follow-up with Kaden Bejarano MD.   - Retest hearing as medically indicated and/or sooner if a change in hearing is noted. - If desired, schedule a Hearing Aid Evaluation (HAE) appointment to discuss hearing aid options. - Utilize \"Good Communication Strategies\" as discussed to assist in speech understanding with communication partners. - Maintain a sound enriched environment to assist in the management of tinnitus symptoms.        Obed Tinajero  Audiologist    Chart CC'd to: Kaden Bejarano MD      Degree of   Hearing Sensitivity dB Range   Within Normal Limits (WNL) 0 - 20   Mild 20 - 40   Moderate 40 - 55   Moderately-Severe 55 - 70   Severe 70 - 90   Profound 90 +

## 2022-05-24 PROBLEM — R07.0 CHRONIC THROAT PAIN: Status: RESOLVED | Noted: 2020-01-01 | Resolved: 2022-05-24

## 2022-05-24 PROBLEM — G89.29 CHRONIC THROAT PAIN: Status: RESOLVED | Noted: 2020-01-01 | Resolved: 2022-05-24

## 2022-10-11 ENCOUNTER — HOSPITAL ENCOUNTER (OUTPATIENT)
Dept: WOMENS IMAGING | Age: 77
Discharge: HOME | End: 2022-10-11
Payer: MEDICARE

## 2022-10-11 DIAGNOSIS — Z12.31 BREAST CANCER SCREENING BY MAMMOGRAM: ICD-10-CM

## 2022-10-11 DIAGNOSIS — Z78.0 OSTEOPENIA AFTER MENOPAUSE: ICD-10-CM

## 2022-10-11 DIAGNOSIS — M85.80 OSTEOPENIA AFTER MENOPAUSE: ICD-10-CM

## 2022-10-11 PROCEDURE — 77080 DXA BONE DENSITY AXIAL: CPT

## 2022-10-11 PROCEDURE — 77063 BREAST TOMOSYNTHESIS BI: CPT

## 2022-12-01 ENCOUNTER — HOSPITAL ENCOUNTER (OUTPATIENT)
Age: 77
Discharge: HOME | End: 2022-12-01
Payer: MEDICARE

## 2022-12-01 ENCOUNTER — HOSPITAL ENCOUNTER (OUTPATIENT)
Dept: CT IMAGING | Age: 77
Discharge: HOME | End: 2022-12-01
Payer: MEDICARE

## 2022-12-01 DIAGNOSIS — R79.89 ABNORMAL LFTS: ICD-10-CM

## 2022-12-01 DIAGNOSIS — R63.4 UNINTENTIONAL WEIGHT LOSS: ICD-10-CM

## 2022-12-01 DIAGNOSIS — R74.01 ELEVATION OF LEVELS OF LIVER TRANSAMINASE LEVELS: ICD-10-CM

## 2022-12-01 DIAGNOSIS — R74.8 ELEVATED LIVER ENZYMES: ICD-10-CM

## 2022-12-01 LAB
ALBUMIN SERPL-MCNC: 4.1 G/DL (ref 3.4–5)
ALP BLD-CCNC: 649 U/L (ref 40–129)
ALT SERPL-CCNC: 285 U/L (ref 10–40)
ANTI-NUCLEAR ANTIBODY (ANA): NEGATIVE
AST SERPL-CCNC: 153 U/L (ref 15–37)
BACTERIA: ABNORMAL /HPF
BILIRUB SERPL-MCNC: 5.9 MG/DL (ref 0–1)
BILIRUBIN DIRECT: 4.7 MG/DL (ref 0–0.3)
BILIRUBIN URINE: ABNORMAL
BILIRUBIN, INDIRECT: 1.2 MG/DL (ref 0–1)
BLOOD, URINE: NEGATIVE
CALCIUM OXALATE CRYSTALS: PRESENT
CLARITY: ABNORMAL
COLOR: ABNORMAL
EPITHELIAL CELLS, UA: 4 /HPF (ref 0–5)
GLUCOSE URINE: NEGATIVE MG/DL
HAV IGM SER IA-ACNC: NORMAL
HBV SURFACE AB TITR SER: <3.5 MIU/ML
HEPATITIS B CORE IGM ANTIBODY: NORMAL
HEPATITIS B SURFACE ANTIGEN INTERPRETATION: NORMAL
HEPATITIS C ANTIBODY INTERPRETATION: NORMAL
HYALINE CASTS: 0 /LPF (ref 0–8)
INR BLD: 0.9 (ref 0.87–1.14)
KETONES, URINE: 15 MG/DL
LEUKOCYTE ESTERASE, URINE: ABNORMAL
MICROSCOPIC EXAMINATION: YES
NITRITE, URINE: NEGATIVE
PH UA: 5.5 (ref 5–8)
PROTEIN UA: NEGATIVE MG/DL
PROTHROMBIN TIME: 12.1 SEC (ref 11.7–14.5)
RBC UA: 2 /HPF (ref 0–4)
SPECIFIC GRAVITY UA: 1.01 (ref 1–1.03)
TOTAL PROTEIN: 7.2 G/DL (ref 6.4–8.2)
URINE TYPE: ABNORMAL
UROBILINOGEN, URINE: 1 E.U./DL
WBC UA: 4 /HPF (ref 0–5)

## 2022-12-01 PROCEDURE — 80074 ACUTE HEPATITIS PANEL: CPT

## 2022-12-01 PROCEDURE — 6360000004 HC RX CONTRAST MEDICATION: Performed by: INTERNAL MEDICINE

## 2022-12-01 PROCEDURE — 85610 PROTHROMBIN TIME: CPT

## 2022-12-01 PROCEDURE — 82103 ALPHA-1-ANTITRYPSIN TOTAL: CPT

## 2022-12-01 PROCEDURE — 86015 ACTIN ANTIBODY EACH: CPT

## 2022-12-01 PROCEDURE — 82390 ASSAY OF CERULOPLASMIN: CPT

## 2022-12-01 PROCEDURE — 86704 HEP B CORE ANTIBODY TOTAL: CPT

## 2022-12-01 PROCEDURE — 36415 COLL VENOUS BLD VENIPUNCTURE: CPT

## 2022-12-01 PROCEDURE — 74177 CT ABD & PELVIS W/CONTRAST: CPT

## 2022-12-01 PROCEDURE — 81001 URINALYSIS AUTO W/SCOPE: CPT

## 2022-12-01 PROCEDURE — 86038 ANTINUCLEAR ANTIBODIES: CPT

## 2022-12-01 PROCEDURE — 86706 HEP B SURFACE ANTIBODY: CPT

## 2022-12-01 PROCEDURE — 80076 HEPATIC FUNCTION PANEL: CPT

## 2022-12-01 RX ADMIN — IOPAMIDOL 75 ML: 755 INJECTION, SOLUTION INTRAVENOUS at 10:42

## 2022-12-01 RX ADMIN — IOPAMIDOL 50 ML: 612 INJECTION, SOLUTION INTRAVENOUS at 10:42

## 2022-12-02 LAB
ALPHA-1 ANTITRYPSIN: 177 MG/DL (ref 90–200)
CERULOPLASMIN: 33 MG/DL (ref 16–45)
HEPATITIS B CORE TOTAL ANTIBODY: NEGATIVE

## 2022-12-02 RX ORDER — ATORVASTATIN CALCIUM 20 MG/1
20 TABLET, FILM COATED ORAL DAILY
COMMUNITY

## 2022-12-02 NOTE — PROGRESS NOTES
DOS.C-Difficile admission screening and protocol:       * Admitted with diarrhea? [] YES    [x]  NO     *Prior history of C-Diff. In last 3 months? [] YES    [x]  NO     *Antibiotic use in the past 6-8 weeks? [x]  NO    []  YES      If yes, which: REASON_________________     *Prior hospitalization or nursing home in the last month? []  YES    [x]  NO     SAFETY FIRST. .call before you fall    [address] time__1015__________        Surgery time___1145_________    Do not eat or drink anything after 12:00 midnight prior to your surgery. This includes water chewing gum, mints and ice chips- the Day of Surgery. You may brush your teeth and gargle the morning of your surgery, but do not swallow the water     Please see your family doctor/pediatrician for a history and physical and/or questions concerning medications. Bring any test results/reports from your physicians office. If you are under the care of a heart doctor or specialist doctor, please be aware that you may be asked to them for clearance    You may be asked to stop blood thinners such as Coumadin, Plavix, Fragmin, Lovenox, etc., or any anti-inflammatories such as:  Aspirin, Ibuprofen, Advil, Naproxen prior to your surgery. We also ask that you stop any OTC medications such as fish oil, vitamin E, glucosamine, garlic, Multivitamins, COQ 10, etc.    We ask that you do not smoke 24 hours prior to surgery  We ask that you do not  drink any alcoholic beverages 24 hours prior to surgery     You must make arrangements for a responsible adult to take you home after your surgery. For your safety you will not be allowed to leave alone or drive yourself home. Your surgery will be cancelled if you do not have a ride home. Also for your safety, it is strongly suggested that someone stay with you the first 24 hours after your surgery.      A parent or legal guardian must accompany a child scheduled for surgery and plan to stay at the hospital until the child is discharged. Please do not bring other children with you. For your comfort, please wear simple loose fitting clothing to the hospital.  Please do not bring valuables. Do not wear any make-up or nail polish on your fingers or toes. For your safety, please do not wear any jewelry or body piercing's on the day of surgery. All jewelry must be removed. If you have dentures, they will be removed before going to operating room. For your convenience, we will provide you with a container. If you wear contact lenses or glasses, they will be removed, please bring a case for them. If you have a living will and a durable power of  for healthcare, please bring in a copy. As part of our patient safety program to minimize surgical site infections, we ask you to do the following:    Please notify your surgeon if you develop any illness between         now and the day of your surgery. This includes a cough, cold, fever, sore throat, nausea,         or vomiting, and diarrhea, etc.   Please notify your surgeon if you experience dizziness, shortness         of breath or blurred vision between now and the time of your surgery. Do not shave your operative site 96 hours prior to surgery. For face and neck surgery, men may use an electric razor 48 hours   prior to surgery. You may shower the night before surgery or the morning of   your surgery with an antibacterial soap. You will need to bring a photo ID and insurance card     If you use a C-pap or Bi-pap machine, please bring your machine with you to the hospital     Our goal is to provide you with excellent care, therefore, visitors will be limited to so that we may focus on providing this care for you. Please contact your surgeon office, if you have any further questions.                  [facility] phone number:  [address] PAT fax number: [phone]    Please note these are generalized instructions for all surgical cases, you may be provided with more specific instructions according to your surgery.

## 2022-12-03 LAB — F-ACTIN AB IGG: 8 UNITS (ref 0–19)

## 2022-12-05 ENCOUNTER — ANESTHESIA EVENT (OUTPATIENT)
Dept: ENDOSCOPY | Age: 77
End: 2022-12-05
Payer: MEDICARE

## 2022-12-07 ENCOUNTER — ANESTHESIA (OUTPATIENT)
Dept: ENDOSCOPY | Age: 77
End: 2022-12-07
Payer: MEDICARE

## 2022-12-07 ENCOUNTER — APPOINTMENT (OUTPATIENT)
Dept: GENERAL RADIOLOGY | Age: 77
End: 2022-12-07
Attending: INTERNAL MEDICINE
Payer: MEDICARE

## 2022-12-07 ENCOUNTER — HOSPITAL ENCOUNTER (OUTPATIENT)
Age: 77
Setting detail: OUTPATIENT SURGERY
Discharge: HOME | End: 2022-12-07
Attending: INTERNAL MEDICINE | Admitting: INTERNAL MEDICINE
Payer: MEDICARE

## 2022-12-07 VITALS
DIASTOLIC BLOOD PRESSURE: 73 MMHG | OXYGEN SATURATION: 98 % | HEART RATE: 94 BPM | WEIGHT: 140 LBS | RESPIRATION RATE: 18 BRPM | BODY MASS INDEX: 23.9 KG/M2 | HEIGHT: 64 IN | SYSTOLIC BLOOD PRESSURE: 152 MMHG | TEMPERATURE: 97.6 F

## 2022-12-07 DIAGNOSIS — K86.89 PANCREATIC MASS: ICD-10-CM

## 2022-12-07 PROCEDURE — 2500000003 HC RX 250 WO HCPCS

## 2022-12-07 PROCEDURE — 3700000001 HC ADD 15 MINUTES (ANESTHESIA): Performed by: INTERNAL MEDICINE

## 2022-12-07 PROCEDURE — 6360000002 HC RX W HCPCS

## 2022-12-07 PROCEDURE — 6370000000 HC RX 637 (ALT 250 FOR IP): Performed by: INTERNAL MEDICINE

## 2022-12-07 PROCEDURE — 2580000003 HC RX 258: Performed by: ANESTHESIOLOGY

## 2022-12-07 PROCEDURE — C1874 STENT, COATED/COV W/DEL SYS: HCPCS | Performed by: INTERNAL MEDICINE

## 2022-12-07 PROCEDURE — 7100000010 HC PHASE II RECOVERY - FIRST 15 MIN: Performed by: INTERNAL MEDICINE

## 2022-12-07 PROCEDURE — 3609020800 HC EGD W/EUS FNA: Performed by: INTERNAL MEDICINE

## 2022-12-07 PROCEDURE — 7100000001 HC PACU RECOVERY - ADDTL 15 MIN: Performed by: INTERNAL MEDICINE

## 2022-12-07 PROCEDURE — 3609018800 HC ERCP DX COLLECTION SPECIMEN BRUSHING/WASHING: Performed by: INTERNAL MEDICINE

## 2022-12-07 PROCEDURE — 2720000010 HC SURG SUPPLY STERILE: Performed by: INTERNAL MEDICINE

## 2022-12-07 PROCEDURE — 3700000000 HC ANESTHESIA ATTENDED CARE: Performed by: INTERNAL MEDICINE

## 2022-12-07 PROCEDURE — 88173 CYTOPATH EVAL FNA REPORT: CPT

## 2022-12-07 PROCEDURE — 2709999900 HC NON-CHARGEABLE SUPPLY: Performed by: INTERNAL MEDICINE

## 2022-12-07 PROCEDURE — C1769 GUIDE WIRE: HCPCS | Performed by: INTERNAL MEDICINE

## 2022-12-07 PROCEDURE — 74328 X-RAY BILE DUCT ENDOSCOPY: CPT

## 2022-12-07 PROCEDURE — 88305 TISSUE EXAM BY PATHOLOGIST: CPT

## 2022-12-07 PROCEDURE — 88172 CYTP DX EVAL FNA 1ST EA SITE: CPT

## 2022-12-07 PROCEDURE — 7100000011 HC PHASE II RECOVERY - ADDTL 15 MIN: Performed by: INTERNAL MEDICINE

## 2022-12-07 PROCEDURE — 3609015100 HC ERCP STENT PLACEMENT BILIARY/PANCREATIC DUCT: Performed by: INTERNAL MEDICINE

## 2022-12-07 PROCEDURE — 88177 CYTP FNA EVAL EA ADDL: CPT

## 2022-12-07 PROCEDURE — 7100000000 HC PACU RECOVERY - FIRST 15 MIN: Performed by: INTERNAL MEDICINE

## 2022-12-07 DEVICE — STENT SYSTEM RMV
Type: IMPLANTABLE DEVICE | Site: BILE DUCT | Status: FUNCTIONAL
Brand: WALLFLEX BILIARY

## 2022-12-07 RX ORDER — FENTANYL CITRATE 50 UG/ML
INJECTION, SOLUTION INTRAMUSCULAR; INTRAVENOUS PRN
Status: DISCONTINUED | OUTPATIENT
Start: 2022-12-07 | End: 2022-12-07 | Stop reason: SDUPTHER

## 2022-12-07 RX ORDER — SODIUM CHLORIDE 9 MG/ML
INJECTION, SOLUTION INTRAVENOUS PRN
Status: DISCONTINUED | OUTPATIENT
Start: 2022-12-07 | End: 2022-12-07 | Stop reason: HOSPADM

## 2022-12-07 RX ORDER — SODIUM CHLORIDE 0.9 % (FLUSH) 0.9 %
5-40 SYRINGE (ML) INJECTION PRN
Status: DISCONTINUED | OUTPATIENT
Start: 2022-12-07 | End: 2022-12-07 | Stop reason: HOSPADM

## 2022-12-07 RX ORDER — SODIUM CHLORIDE 0.9 % (FLUSH) 0.9 %
5-40 SYRINGE (ML) INJECTION EVERY 12 HOURS SCHEDULED
Status: DISCONTINUED | OUTPATIENT
Start: 2022-12-07 | End: 2022-12-07 | Stop reason: HOSPADM

## 2022-12-07 RX ORDER — DEXAMETHASONE SODIUM PHOSPHATE 4 MG/ML
INJECTION, SOLUTION INTRA-ARTICULAR; INTRALESIONAL; INTRAMUSCULAR; INTRAVENOUS; SOFT TISSUE PRN
Status: DISCONTINUED | OUTPATIENT
Start: 2022-12-07 | End: 2022-12-07 | Stop reason: SDUPTHER

## 2022-12-07 RX ORDER — PROPOFOL 10 MG/ML
INJECTION, EMULSION INTRAVENOUS PRN
Status: DISCONTINUED | OUTPATIENT
Start: 2022-12-07 | End: 2022-12-07 | Stop reason: SDUPTHER

## 2022-12-07 RX ORDER — LIDOCAINE HYDROCHLORIDE 20 MG/ML
INJECTION, SOLUTION EPIDURAL; INFILTRATION; INTRACAUDAL; PERINEURAL PRN
Status: DISCONTINUED | OUTPATIENT
Start: 2022-12-07 | End: 2022-12-07 | Stop reason: SDUPTHER

## 2022-12-07 RX ORDER — SUCCINYLCHOLINE/SOD CL,ISO/PF 200MG/10ML
SYRINGE (ML) INTRAVENOUS PRN
Status: DISCONTINUED | OUTPATIENT
Start: 2022-12-07 | End: 2022-12-07 | Stop reason: SDUPTHER

## 2022-12-07 RX ORDER — ROCURONIUM BROMIDE 10 MG/ML
INJECTION, SOLUTION INTRAVENOUS PRN
Status: DISCONTINUED | OUTPATIENT
Start: 2022-12-07 | End: 2022-12-07 | Stop reason: SDUPTHER

## 2022-12-07 RX ORDER — ONDANSETRON 2 MG/ML
INJECTION INTRAMUSCULAR; INTRAVENOUS PRN
Status: DISCONTINUED | OUTPATIENT
Start: 2022-12-07 | End: 2022-12-07 | Stop reason: SDUPTHER

## 2022-12-07 RX ORDER — ONDANSETRON 2 MG/ML
4 INJECTION INTRAMUSCULAR; INTRAVENOUS
Status: DISCONTINUED | OUTPATIENT
Start: 2022-12-07 | End: 2022-12-07 | Stop reason: HOSPADM

## 2022-12-07 RX ADMIN — FENTANYL CITRATE 25 MCG: 50 INJECTION INTRAMUSCULAR; INTRAVENOUS at 11:47

## 2022-12-07 RX ADMIN — DEXAMETHASONE SODIUM PHOSPHATE 4 MG: 4 INJECTION, SOLUTION INTRAMUSCULAR; INTRAVENOUS at 11:52

## 2022-12-07 RX ADMIN — PROPOFOL 120 MG: 10 INJECTION, EMULSION INTRAVENOUS at 11:47

## 2022-12-07 RX ADMIN — ROCURONIUM BROMIDE 5 MG: 10 INJECTION INTRAVENOUS at 11:47

## 2022-12-07 RX ADMIN — Medication 120 MG: at 11:48

## 2022-12-07 RX ADMIN — LIDOCAINE HYDROCHLORIDE 60 MG: 20 INJECTION, SOLUTION EPIDURAL; INFILTRATION; INTRACAUDAL; PERINEURAL at 11:47

## 2022-12-07 RX ADMIN — FENTANYL CITRATE 25 MCG: 50 INJECTION INTRAMUSCULAR; INTRAVENOUS at 11:55

## 2022-12-07 RX ADMIN — ONDANSETRON 4 MG: 2 INJECTION INTRAMUSCULAR; INTRAVENOUS at 11:52

## 2022-12-07 RX ADMIN — FENTANYL CITRATE 25 MCG: 50 INJECTION INTRAMUSCULAR; INTRAVENOUS at 12:56

## 2022-12-07 RX ADMIN — FENTANYL CITRATE 25 MCG: 50 INJECTION INTRAMUSCULAR; INTRAVENOUS at 12:00

## 2022-12-07 RX ADMIN — SODIUM CHLORIDE: 9 INJECTION, SOLUTION INTRAVENOUS at 10:50

## 2022-12-07 ASSESSMENT — PAIN DESCRIPTION - LOCATION
LOCATION: ABDOMEN

## 2022-12-07 ASSESSMENT — PAIN SCALES - GENERAL
PAINLEVEL_OUTOF10: 5
PAINLEVEL_OUTOF10: 3

## 2022-12-07 ASSESSMENT — PAIN DESCRIPTION - ONSET
ONSET: ON-GOING
ONSET: ON-GOING

## 2022-12-07 ASSESSMENT — PAIN DESCRIPTION - FREQUENCY
FREQUENCY: CONTINUOUS
FREQUENCY: CONTINUOUS

## 2022-12-07 ASSESSMENT — ENCOUNTER SYMPTOMS: SHORTNESS OF BREATH: 0

## 2022-12-07 ASSESSMENT — PAIN - FUNCTIONAL ASSESSMENT
PAIN_FUNCTIONAL_ASSESSMENT: PREVENTS OR INTERFERES SOME ACTIVE ACTIVITIES AND ADLS
PAIN_FUNCTIONAL_ASSESSMENT: NONE - DENIES PAIN

## 2022-12-07 ASSESSMENT — PAIN DESCRIPTION - DESCRIPTORS: DESCRIPTORS: DISCOMFORT

## 2022-12-07 ASSESSMENT — PAIN DESCRIPTION - PAIN TYPE
TYPE: ACUTE PAIN
TYPE: ACUTE PAIN

## 2022-12-07 NOTE — PROGRESS NOTES
Pt resting comfortably in bed, states pain still minimal at 3/10 at this time. States ready to see . Pt states concerned for urinating on self, gown wet but sheet and blankets dry. Pt changed at request. VSS. No signs of distress noted at this time.

## 2022-12-07 NOTE — DISCHARGE INSTRUCTIONS
Impression:  1. T2N0Mx pancreas mass. FNA performed. 2.  Resultant biliary stricture. Biliary sphincterotomy performed, brushings obtained, and a fully covered metal Wallflex biliary stent was placed across the stricture. Recommendations:  1. Clear diet. If does well overnight can advance diet as tolerated tomorrow. 2.  Call on Monday for biopsy results. 3.  Will refer to Oncology and surgery. I discussed with Dr. Tanvir Doss with oncology who will see on Friday and decide on surgical referral.    Yanira Quiles MD  [facility] [place], [place]  12/7/2022    Discharge Instructions for ERCP    Endoscopic retrograde cholangiopancreatography (ERCP) allows the physician to enter the bile duct and the pancreatic duct with a probe. Contrast dye can then be injected into these ducts allowing the physician to evaluate the duct on x-ray imaging. If a stone is identified, this can be removed by the endoscopist.  If a stricture is identified, biopsies can be obtained and a stent can be placed. The primary complication of ERCP is pancreatitis. This occurs because the bile duct and the pancreatic duct empty through the same opening. Entering this opening can lead to irritation of the pancreas which contains digestive enzymes. If these enzymes are released in the pancreas, this can lead to an inflammatory response of the pancreas called pancreatitis. Usually, pancreatitis resolves in 3-4 days. Treatment is with pancreatic rest (nothing to eat) and intravenous fluids. Rarely pancreatitis can be more severe and last months to years or require surgery. Other risks with ERCP include perforation (which might require surgery) and bleeding (which could require blood transfusion ). What You Will Need:  Someone to drive you home after the procedure     Steps to Take:  [address] when you get home.    Because the sedative will make you drowsy, don't drive, operate machinery, or make important decisions the day of the procedure. Feelings of bloating, gas, or cramping may persist for 24 hours. Diet -  Stay on a clear liquid diet the day of the procedure. This include jello or clear broth. Do not drink alcohol for 24 hours. If clear diet is tolerated, then the day after the procedure patients can eat a low fat diet, but still avoid fried or greasy foods for 3 days following the procedure. Physical Activity -  Ask your doctor when you will be able to return to work. Do not drive, operate heavy machinery, or do activities that require coordination or balance for 24 hours. Otherwise, return to your normal routine as soon as you are comfortable to do so, which is usually the next day after the procedure. Medications - When taking medications, it's important to: Take your medication as directed, not more, not less, not at a different time. Do not stop taking them without consulting your healthcare provider. Don't share them with anyone else. Know what effects and side effects to expect, and report them to your healthcare provider. If you are taking more than one drug, even if it is an over-the-counter medication, herb, or dietary supplement, be sure to check with a physician or pharmacist about drug interactions. Plan ahead for refills so you don't run out. Follow-up:  The doctor will usually give you a preliminary report after the medication wears off and you are more alert. The results from a biopsy can take as long as 1-2 weeks to be completed. Schedule a follow-up appointment as directed by your doctor. Call Your Doctor If Any of the Following Occurs:  Vomiting of blood or passing large black tarry stools  Severe abdominal pain   Hard, swollen abdomen   Signs of infection, including fever or chills   Inability to pass gas or stool   Coughing, shortness of breath, chest pain, severe nausea or vomiting     In case of an emergency, call 911 immediately.

## 2022-12-07 NOTE — H&P
Pre-operative History and Physical    Patient: Meseret Javier  : 1945  Acct#:     HISTORY OF PRESENT ILLNESS:    The patient is a 65 y.o. female who presents with 2.6cm pancreatic neck mass with biliary and pancreatic strictures. Does compress the SMV. Planning EUS with FNA and ERCP with palliative biliary stenting. Past Medical History:        Diagnosis Date    Adenomatous colon polyp     Jamia Niece every 5 years    adolescent scoliosis     mostly lumbar    Basal cell carcinoma of skin     sees derm    [facility]    33 treatments of radiation     Breast cancer ([facility]     right: stage 0, lumpectomy, xrt, h/onc Dr Yusuf [facility]    Chronic throat pain     resolved with therapy    Elevated BP without diagnosis of hypertension     white coat, runs 120-130/60-77 at home    High cholesterol 2019    cv risk 21%    Osteopenia after menopause 2018    on calcium and vit D    Prolonged emergence from general anesthesia       Past Surgical History:        Procedure Laterality Date    APPENDECTOMY      at the same time as hys    BREAST LUMPECTOMY Right     CHOLECYSTECTOMY, LAPAROSCOPIC      post op infection    HYSTERECTOMY, TOTAL ABDOMINAL (CERVIX REMOVED)      fibroids still has ovaries    TONSILLECTOMY      as a child      Medications Prior to Admission:   No current facility-administered medications on file prior to encounter.      Current Outpatient Medications on File Prior to Encounter   Medication Sig Dispense Refill    atorvastatin (LIPITOR) 20 MG tablet Take 20 mg by mouth daily      Calcium Carbonate-Vitamin D (CALCIUM-VITAMIN D3 PO) Take 600 mg by mouth daily (Patient not taking: No sig reported)      Multiple Vitamins-Minerals (MULTIVITAMIN ADULT PO) Take by mouth (Patient not taking: No sig reported)          Allergies:  Fosamax [alendronate], Iodine, Pcn [penicillins], Pseudoephedrine, and Cephalosporins    Social History:   Social History     Socioeconomic History Marital status:      Spouse name: Not on file    Number of children: 2    Years of education: Not on file    Highest education level: Not on file   Occupational History    Occupation: homemaker   Tobacco Use    Smoking status: Never    Smokeless tobacco: Never    Tobacco comments:     father smoked when she was child   Vaping Use    Vaping Use: Never used   Substance and Sexual Activity    Alcohol use: Not Currently     Alcohol/week: 0.0 - 2.0 standard drinks    Drug use: Never    Sexual activity: Not on file   Other Topics Concern    Not on file   Social History Narrative    Not on file     Social Determinants of Health     Financial Resource Strain: Low Risk     Difficulty of Paying Living Expenses: Not hard at all   Food Insecurity: No Food Insecurity    Worried About Running Out of Food in the Last Year: Never true    [address] in the Last Year: Never true   Transportation Needs: Not on file   Physical Activity: Insufficiently Active    Days of Exercise per Week: 3 days    Minutes of Exercise per Session: 20 min   Stress: Not on file   Social Connections: Not on file   Intimate Partner Violence: Not on file   Housing Stability: Not on file      Family History:       Problem Relation Age of Onset    Atrial Fibrillation Mother 80        lived until 80.8    Hypertension Mother     Heart Failure Mother     Pacemaker Father 79         76    Heart Failure Father     Atrial Fibrillation Sister     Thyroid Disease Sister     Hypertension Sister     Breast Cancer Sister     Dementia Sister     Other Sister         fell and brain bleed    Atrial Fibrillation Sister     Dementia Maternal Aunt         PHYSICAL EXAM:      BP (!) 149/79   Pulse 93   Temp 97 °F (36.1 °C) (Temporal)   Resp 18   Ht 5' 4\" (1.626 m)   Wt 140 lb (63.5 kg)   LMP  (LMP Unknown)   SpO2 98%   BMI 24.03 kg/m²  I        Heart:  RRR    Lungs:  CTA b    Abdomen:  S/NT/ND/+BS      ASSESSMENT AND PLAN:  ASA: per anesthesia Mallampati: per anesthesia  1. Patient is a 65 y.o. female here for EUS and ERCP   2. Procedure options, risks and benefits reviewed with the patient. The patient expresses understanding.     Chip Jacob

## 2022-12-07 NOTE — PROGRESS NOTES
Family Conference Note      Date of conference:  12/12/20    Diagnosis:  Embryonal rhabdomyosarcoma    Attendees:  Gold Maravilla, mother, father    Performance score: 90% ([place])    Clinical trials:  [id number], [id number]    I met with Humaira's family to discuss their diagnosis of ERMS. Topics discussed included intended plan of care, side effects of all chemotherapy agents and radiation therapy, resultant immunosuppression with increased risk of infection, need for blood and platelet transfusions, risk for hospitalization and potential long-term sequelae of treatment, including developmental and fertility issues and an increased risk for second malignancies.  Provided copies of treatment road map as well as drug information sheets for all chemotherapeutic agents.  I also discussed enrollment on [id number], including all risks and benefits of such. The family had opportunity to ask questions regarding everything discussed and informed consent forms were signed.     Gold Maravilla    Total time with family 120 minutes.     Pt to pacu from endo. Pt asleep but arousable to voice at arrival. Dr Melendrez Loss at bedside. Pt palced on monitor, on 3L via NC per CRNA. VSS. No signs of distress noted at this time. Report obtained.

## 2022-12-07 NOTE — OP NOTE
Endoscopy Note    Patient: Shital Baker   : 1945  Acct#:     Procedure: EUS with FNA  Doppler of mesenteric vessels  Endoscopic retrograde cholangiopancreatography with biliary sphincterotomy  ERCP with brushings  ERCP with stent placement  Interpretation of fluoroscopy    Date: 2022    Surgeon:  Lillie Hanson MD, MD    Referring Physician:  Dr. Isa Munroe    Anesthesia:  General per Anesthesia. Indications:  65year old with 2.6cm pancreatic neck mass with biliary and pancreatic strictures. Does compress the SMV. Planning EUS with FNA and ERCP with palliative biliary stenting. Consent: Informed consent was obtained from the patient after explanation of indications, benefits, possible risks and complications of the procedure. Specifically the risk of bleeding, infection, perforation, pancreatitis, anesthesia complications, and remote possibility of mortality among others were explained. Monitoring: Patient was monitored with continuous pulse oximetry, telemetry, and intermittent blood pressures. Procedure:   A time-out was performed. The patient and staff were in agreement as to the correct patient and procedure. The above anesthesia was administered by the anesthesia department. The patient was placed in the left lateral prone position. Next, the curvilinear array echoendoscope was advanced without difficulty to the 2nd portion of the duodenum. Endosonographic views were good, patient toleration was good. Findings:   Major Papilla: Endoscopically, the major papilla was normal.  Endosonographically, the major papilla appeared normal.  There were periampullary diverticuli. Pancreas: The pancreatic duct measured 2.0mm off the major papilla. There was a 3.32 x 2.18 cm hypoechoic mass in the neck of the pancreas with biliary and pancreatic obstruction. The pancreatic duct measured 5.2mm in the body of the pancreas. There was abutment of the SMV.   No involvement of the celiac artery or superior mesenteric artery. Using doppler ultrasound to find a vessel-free path into the mass, a 22 G Acquire needle was passed under ultrasound guidance directly into the mass. 6 passes were made. Slides were made and specimen was also sent in formalin. Preliminary results showed atypical cells. Liver: The liver appeared normal without mass lesions. There was intrahepatic biliary dilation. Doppler evaluation of the celiac artery, splenic artery, hepatic artery, superior mesenteric artery, superior mesenteric vein, portal vein, and splenic vein was performed. The duodenum and stomach were decompressed and the scope was withdrawn from the patient. The patient tolerated the procedure well and was taken to the post anesthesia care unit in good condition. Doppler Interpretation:  Doppler evaluation was performed and interpreted on the spot by the endoscopist without the presence of a radiologist.      The Olympus TJF therapeutic duodenoscope was placed in the patient's mouth and blindly advanced into the esophagus. The scope was then advanced through the esophagus, stomach and into the second portion of the duodenum where the major papilla was visualized. Major Papilla: Normal.  There were 2 diverticuli adjacent to the papilla.  Film:  Normal.  Cholangiogram:  The biliary orifice was then selectively cannulated with a Jagtome. Contrast was injected and revealed a tight 1cm stricture in the common bile duct. There was upstream dilation of the common hepatic duct and intrahepatics. Next, a 0.025\" Thai Friendly was passed without resistance into the bile duct and using the wire as a guide and using blended cautery, a 1cm biliary sphincterotomy was performed to the 12:00 position without complications. Next, brushings were obtained from the stricture.     Next, the wire was left in place and a 10mm x 40mm fully covered metal biliary stent was placed with the proximal aspect in the common hepatic duct and the distal aspect in the duodenum. Bile and contrast drained. Pancreatogram:  The pancreatic duct was not cannulated. The cannulas were then withdrawn. The duodenum and stomach were decompressed and the scope was withdrawn from the patient. The patient tolerated the procedure well and was taken to the post anesthesia care unit in good condition. Radiological Interpretation:  All fluoroscopic images and still x-ray films were carefullly examined and interpreted by the endoscopist on the spot during the procedure in the absence of radiologist.  These interpretations guided the course of the procedure and the interventions mentioned above and below. Estimated blood loss: minimal  Specimens taken: yes      Impression:  1. T2N0Mx pancreas mass. FNA performed. 2.  Resultant biliary stricture. Biliary sphincterotomy performed, brushings obtained, and a fully covered metal Wallflex biliary stent was placed across the stricture. Recommendations:  1. Clear diet. If does well overnight can advance diet as tolerated tomorrow. 2.  Call on Monday for biopsy results. 3.  Will refer to Oncology and surgery.   I discussed with Dr. Tonya Oropeza with oncology who will see on Friday and decide on surgical referral.    Cole Duarte  12/7/2022

## 2022-12-07 NOTE — ANESTHESIA PRE PROCEDURE
Department of Anesthesiology  Preprocedure Note       Name:  Eliceo Mohan   Age:  65 y.o.  :  1945                                          MRN:  [id number]         Date:  2022      Surgeon: Maxwell Escobar):  Chely Carty MD    Procedure: Procedure(s):  ENDOSCOPIC RETROGRADE CHOLANGIOPANCREATOGRAPHY  ENDOSCOPIC ULTRASOUND EXAM    Medications prior to admission:   Prior to Admission medications    Medication Sig Start Date End Date Taking? Authorizing Provider   atorvastatin (LIPITOR) 20 MG tablet Take 20 mg by mouth daily   Yes Historical Provider, MD   Calcium Carbonate-Vitamin D (CALCIUM-VITAMIN D3 PO) Take 600 mg by mouth daily  Patient not taking: No sig reported    Historical Provider, MD   Multiple Vitamins-Minerals (MULTIVITAMIN ADULT PO) Take by mouth  Patient not taking: No sig reported    Historical Provider, MD       Current medications:    Current Facility-Administered Medications   Medication Dose Route Frequency Provider Last Rate Last Admin    sodium chloride flush 0.9 % injection 5-40 mL  5-40 mL IntraVENous 2 times per day Marilu Corrigan MD        sodium chloride flush 0.9 % injection 5-40 mL  5-40 mL IntraVENous PRN Marilu Corrigan MD        0.9 % sodium chloride infusion   IntraVENous PRN Marilu Corrigan MD           Allergies:     Allergies   Allergen Reactions    Fosamax [Alendronate]      Throat irritation    Iodine Hives    Pcn [Penicillins] Hives    Pseudoephedrine Hives    Cephalosporins Nausea And Vomiting       Problem List:    Patient Active Problem List   Diagnosis Code    Seborrheic keratoses L82.1    Adenomatous colon polyp D12.6    Hypertension I10    Hypercholesteremia E78.00    Osteopenia M85.80    History of nonmelanoma skin cancer Z85.828    Elevated BP without diagnosis of hypertension R03.0       Past Medical History:        Diagnosis Date    Adenomatous colon polyp     Awanda Ham every 5 years    adolescent scoliosis     mostly lumbar    Basal cell carcinoma of skin     sees derm    Breast CA ([facility] [address] 09/17/2015    33 treatments of radiation 2011    Breast cancer ([facility] [place] [address] 2010    right: stage 0, lumpectomy, xrt, h/onc [facility]    Chronic throat pain 2020    resolved with therapy    Elevated BP without diagnosis of hypertension     white coat, runs 120-130/60-77 at home    High cholesterol 2019    cv risk 21%    Osteopenia after menopause 2018    on calcium and vit D    Prolonged emergence from general anesthesia        Past Surgical History:        Procedure Laterality Date    APPENDECTOMY      at the same time as hys    BREAST LUMPECTOMY Right     CHOLECYSTECTOMY, LAPAROSCOPIC      post op infection    HYSTERECTOMY, TOTAL ABDOMINAL (CERVIX REMOVED)      fibroids still has ovaries    TONSILLECTOMY      as a child       Social History:    Social History     Tobacco Use    Smoking status: Never    Smokeless tobacco: Never    Tobacco comments:     father smoked when she was child   Substance Use Topics    Alcohol use: Not Currently     Alcohol/week: 0.0 - 2.0 standard drinks                                Counseling given: Not Answered  Tobacco comments: father smoked when she was child      Vital Signs (Current):   Vitals:    12/02/22 1100 12/07/22 1031   Weight: 140 lb (63.5 kg) 140 lb (63.5 kg)   Height: 5' 4\" (1.626 m)                                               BP Readings from Last 3 Encounters:   11/30/22 (!) 164/96   11/28/22 (!) 148/98   11/08/22 (!) 148/82       NPO Status: Time of last liquid consumption: 2100                        Time of last solid consumption: 2100                        Date of last liquid consumption: 12/06/22                        Date of last solid food consumption: 12/06/22    BMI:   Wt Readings from Last 3 Encounters:   12/07/22 140 lb (63.5 kg)   11/30/22 140 lb (63.5 kg)   11/28/22 139 lb (63 kg)     Body mass index is 24.03 kg/m².     CBC:   Lab Results   Component Value Date/Time    WBC 5.6 11/28/2022 02:16 PM    RBC 4.55 11/28/2022 02:16 PM    HGB 13.3 11/28/2022 02:16 PM    HCT 40.9 11/28/2022 02:16 PM    MCV 89.9 11/28/2022 02:16 PM    RDW 15.6 11/28/2022 02:16 PM     11/28/2022 02:16 PM       CMP:   Lab Results   Component Value Date/Time     11/28/2022 02:16 PM    K 3.6 11/28/2022 02:16 PM     11/28/2022 02:16 PM    CO2 26 11/28/2022 02:16 PM    BUN 13 11/28/2022 02:16 PM    CREATININE 0.6 11/28/2022 02:16 PM    GFRAA >60 05/18/2022 10:47 AM    AGRATIO 1.6 05/18/2022 10:47 AM    LABGLOM >60 11/28/2022 02:16 PM    GLUCOSE 148 11/28/2022 02:16 PM    PROT 7.2 12/01/2022 09:44 AM    CALCIUM 10.0 11/28/2022 02:16 PM    BILITOT 5.9 12/01/2022 09:44 AM    ALKPHOS 649 12/01/2022 09:44 AM     12/01/2022 09:44 AM     12/01/2022 09:44 AM       POC Tests: No results for input(s): POCGLU, POCNA, POCK, POCCL, POCBUN, POCHEMO, POCHCT in the last 72 hours. Coags:   Lab Results   Component Value Date/Time    PROTIME 12.1 12/01/2022 09:44 AM    INR 0.90 12/01/2022 09:44 AM       HCG (If Applicable): No results found for: PREGTESTUR, PREGSERUM, HCG, HCGQUANT     ABGs: No results found for: PHART, PO2ART, YYF5IEW, TKI2OHO, BEART, N3GSIGUN     Type & Screen (If Applicable):  No results found for: LABABO, LABRH    Drug/Infectious Status (If Applicable):  No results found for: HIV, HEPCAB    COVID-19 Screening (If Applicable):   Lab Results   Component Value Date/Time    COVID19 Not Detected 10/12/2022 02:19 PM           Anesthesia Evaluation  Patient summary reviewed history of anesthetic complications (prolonged emergence):    Airway: Mallampati: II  TM distance: >3 FB   Neck ROM: full  Mouth opening: > = 3 FB   Dental:      Comment: No loose teeth    Pulmonary: breath sounds clear to auscultation      (-) asthma, shortness of breath, recent URI and sleep apnea                           Cardiovascular:    (+) hypertension:,     (-) valvular problems/murmurs, past MI, CAD, CABG/stent, dysrhythmias,  angina,  CHF, orthopnea and no pulmonary hypertension      Rhythm: regular  Rate: normal                    Neuro/Psych:      (-) seizures, neuromuscular disease, TIA, CVA, headaches and psychiatric history           GI/Hepatic/Renal:   (+) liver disease:,      (-) GERD, PUD, hepatitis, no renal disease and bowel prep      ROS comment: PANCREATIC MASS    Jaundice . Endo/Other:    (+) malignancy/cancer. (-) diabetes mellitus, hypothyroidism, hyperthyroidism, blood dyscrasia, arthritis               Abdominal:             Vascular: Other Findings:           Anesthesia Plan      general     ASA 3       Induction: intravenous. MIPS: Prophylactic antiemetics administered. Anesthetic plan and risks discussed with patient and spouse. Plan discussed with CRNA. This pre-anesthesia assessment may be used as a history and physical.    DOS STAFF ADDENDUM:    Pt seen and examined, chart reviewed (including anesthesia, drug and allergy history). No interval changes to history and physical examination. Anesthetic plan, risks, benefits, alternatives, and personnel involved discussed with patient. Patient verbalized an understanding and agrees to proceed.       Del Monsalev MD  December 7, 2022  10:37 AM        Del Monsalve MD   12/7/2022

## 2022-12-07 NOTE — PROGRESS NOTES
Procedure Performed: ENDOSCOPIC ULTRASOUND With: Fine Needle Aspiration    Fine Needle Aspiration of:   Pancreatic Neck (6 passes)          ALL FNA SPECIMENS MANAGED BY CYTOTECHNOLOGIST.         EUS scope balloon removed intact and verified by Dionne Angela RN and ELTON Mckeon

## 2022-12-07 NOTE — PROGRESS NOTES
Pt and family given discharge instructions. Verbalized understanding. Awaiting [facility] consult before discharge.

## 2022-12-12 ENCOUNTER — TELEPHONE (OUTPATIENT)
Dept: INTERVENTIONAL RADIOLOGY/VASCULAR | Age: 77
End: 2022-12-12

## 2022-12-12 DIAGNOSIS — C25.1 PRIMARY CANCER OF BODY OF PANCREAS (HCC): Primary | ICD-10-CM

## 2022-12-12 NOTE — TELEPHONE ENCOUNTER
Order for port placement from Dr. Oksana Jin. Spoke with patient for scheduling. The following was discussed:    Will arrive at 0930 for procedure at 1100  NPO after midnight  -Will have a  home  Does not take any blood thinners. Verbalized understanding of instructions.      Electronically signed by Maritza Herrera RN on 12/12/2022 at 3:18 PM

## 2023-01-12 ENCOUNTER — APPOINTMENT (OUTPATIENT)
Dept: CT IMAGING | Age: 78
DRG: 919 | End: 2023-01-12
Payer: MEDICARE

## 2023-01-12 ENCOUNTER — HOSPITAL ENCOUNTER (INPATIENT)
Age: 78
LOS: 3 days | Discharge: HOME | DRG: 919 | End: 2023-01-16
Attending: INTERNAL MEDICINE | Admitting: INTERNAL MEDICINE
Payer: MEDICARE

## 2023-01-12 ENCOUNTER — APPOINTMENT (OUTPATIENT)
Dept: GENERAL RADIOLOGY | Age: 78
DRG: 919 | End: 2023-01-12
Payer: MEDICARE

## 2023-01-12 DIAGNOSIS — R50.9 FEVER, UNSPECIFIED FEVER CAUSE: Primary | ICD-10-CM

## 2023-01-12 DIAGNOSIS — R11.0 NAUSEA: ICD-10-CM

## 2023-01-12 DIAGNOSIS — R53.1 GENERAL WEAKNESS: ICD-10-CM

## 2023-01-12 LAB
A/G RATIO: 1.1 (ref 1.1–2.2)
ALBUMIN SERPL-MCNC: 3.3 G/DL (ref 3.4–5)
ALP BLD-CCNC: 238 U/L (ref 40–129)
ALT SERPL-CCNC: 45 U/L (ref 10–40)
ANION GAP SERPL CALCULATED.3IONS-SCNC: 14 MMOL/L (ref 3–16)
AST SERPL-CCNC: 36 U/L (ref 15–37)
BASOPHILS ABSOLUTE: 0 K/UL (ref 0–0.2)
BASOPHILS RELATIVE PERCENT: 0.3 %
BILIRUB SERPL-MCNC: 2 MG/DL (ref 0–1)
BUN BLDV-MCNC: 18 MG/DL (ref 7–20)
CALCIUM SERPL-MCNC: 9.6 MG/DL (ref 8.3–10.6)
CHLORIDE BLD-SCNC: 100 MMOL/L (ref 99–110)
CO2: 20 MMOL/L (ref 21–32)
CREAT SERPL-MCNC: 0.6 MG/DL (ref 0.6–1.2)
EOSINOPHILS ABSOLUTE: 0 K/UL (ref 0–0.6)
EOSINOPHILS RELATIVE PERCENT: 0.1 %
GFR SERPL CREATININE-BSD FRML MDRD: >60 ML/MIN/{1.73_M2}
GLUCOSE BLD-MCNC: 103 MG/DL (ref 70–99)
HCT VFR BLD CALC: 35.9 % (ref 36–48)
HEMOGLOBIN: 12 G/DL (ref 12–16)
LACTIC ACID: 1.8 MMOL/L (ref 0.4–2)
LIPASE: 9 U/L (ref 13–60)
LYMPHOCYTES ABSOLUTE: 0.4 K/UL (ref 1–5.1)
LYMPHOCYTES RELATIVE PERCENT: 3.1 %
MCH RBC QN AUTO: 29.5 PG (ref 26–34)
MCHC RBC AUTO-ENTMCNC: 33.3 G/DL (ref 31–36)
MCV RBC AUTO: 88.7 FL (ref 80–100)
MONOCYTES ABSOLUTE: 0.3 K/UL (ref 0–1.3)
MONOCYTES RELATIVE PERCENT: 2.3 %
NEUTROPHILS ABSOLUTE: 13.4 K/UL (ref 1.7–7.7)
NEUTROPHILS RELATIVE PERCENT: 94.2 %
PDW BLD-RTO: 15.1 % (ref 12.4–15.4)
PLATELET # BLD: 151 K/UL (ref 135–450)
PMV BLD AUTO: 9.7 FL (ref 5–10.5)
POTASSIUM SERPL-SCNC: 3.2 MMOL/L (ref 3.5–5.1)
RAPID INFLUENZA  B AGN: NEGATIVE
RAPID INFLUENZA A AGN: NEGATIVE
RBC # BLD: 4.05 M/UL (ref 4–5.2)
SARS-COV-2, NAAT: NOT DETECTED
SODIUM BLD-SCNC: 134 MMOL/L (ref 136–145)
TOTAL PROTEIN: 6.2 G/DL (ref 6.4–8.2)
WBC # BLD: 14.2 K/UL (ref 4–11)

## 2023-01-12 PROCEDURE — 96374 THER/PROPH/DIAG INJ IV PUSH: CPT

## 2023-01-12 PROCEDURE — 74177 CT ABD & PELVIS W/CONTRAST: CPT

## 2023-01-12 PROCEDURE — 83605 ASSAY OF LACTIC ACID: CPT

## 2023-01-12 PROCEDURE — 2580000003 HC RX 258: Performed by: PHYSICIAN ASSISTANT

## 2023-01-12 PROCEDURE — 99285 EMERGENCY DEPT VISIT HI MDM: CPT

## 2023-01-12 PROCEDURE — 87040 BLOOD CULTURE FOR BACTERIA: CPT

## 2023-01-12 PROCEDURE — 36415 COLL VENOUS BLD VENIPUNCTURE: CPT

## 2023-01-12 PROCEDURE — 80053 COMPREHEN METABOLIC PANEL: CPT

## 2023-01-12 PROCEDURE — 96361 HYDRATE IV INFUSION ADD-ON: CPT

## 2023-01-12 PROCEDURE — 71045 X-RAY EXAM CHEST 1 VIEW: CPT

## 2023-01-12 PROCEDURE — 6360000002 HC RX W HCPCS: Performed by: PHYSICIAN ASSISTANT

## 2023-01-12 PROCEDURE — 93005 ELECTROCARDIOGRAM TRACING: CPT | Performed by: PHYSICIAN ASSISTANT

## 2023-01-12 PROCEDURE — 83690 ASSAY OF LIPASE: CPT

## 2023-01-12 PROCEDURE — 87804 INFLUENZA ASSAY W/OPTIC: CPT

## 2023-01-12 PROCEDURE — 87186 SC STD MICRODIL/AGAR DIL: CPT

## 2023-01-12 PROCEDURE — 96375 TX/PRO/DX INJ NEW DRUG ADDON: CPT

## 2023-01-12 PROCEDURE — 87635 SARS-COV-2 COVID-19 AMP PRB: CPT

## 2023-01-12 PROCEDURE — 85025 COMPLETE CBC W/AUTO DIFF WBC: CPT

## 2023-01-12 PROCEDURE — 87150 DNA/RNA AMPLIFIED PROBE: CPT

## 2023-01-12 RX ORDER — DIPHENHYDRAMINE HYDROCHLORIDE 50 MG/ML
25 INJECTION INTRAMUSCULAR; INTRAVENOUS ONCE
Status: COMPLETED | OUTPATIENT
Start: 2023-01-12 | End: 2023-01-12

## 2023-01-12 RX ORDER — ONDANSETRON 2 MG/ML
4 INJECTION INTRAMUSCULAR; INTRAVENOUS ONCE
Status: COMPLETED | OUTPATIENT
Start: 2023-01-12 | End: 2023-01-12

## 2023-01-12 RX ORDER — 0.9 % SODIUM CHLORIDE 0.9 %
500 INTRAVENOUS SOLUTION INTRAVENOUS ONCE
Status: COMPLETED | OUTPATIENT
Start: 2023-01-12 | End: 2023-01-13

## 2023-01-12 RX ORDER — POTASSIUM CHLORIDE 20 MEQ/1
40 TABLET, EXTENDED RELEASE ORAL ONCE
Status: COMPLETED | OUTPATIENT
Start: 2023-01-13 | End: 2023-01-13

## 2023-01-12 RX ADMIN — ONDANSETRON 4 MG: 2 INJECTION INTRAMUSCULAR; INTRAVENOUS at 23:39

## 2023-01-12 RX ADMIN — SODIUM CHLORIDE 500 ML: 9 INJECTION, SOLUTION INTRAVENOUS at 23:24

## 2023-01-12 RX ADMIN — DIPHENHYDRAMINE HYDROCHLORIDE 25 MG: 50 INJECTION, SOLUTION INTRAMUSCULAR; INTRAVENOUS at 23:27

## 2023-01-13 PROBLEM — C25.0 MALIGNANT NEOPLASM OF HEAD OF PANCREAS (HCC): Status: ACTIVE | Noted: 2023-01-13

## 2023-01-13 PROBLEM — R50.9 FEVER AND CHILLS: Status: ACTIVE | Noted: 2023-01-13

## 2023-01-13 PROBLEM — R11.2 INTRACTABLE NAUSEA AND VOMITING: Status: ACTIVE | Noted: 2023-01-13

## 2023-01-13 LAB
ALBUMIN SERPL-MCNC: 3.1 G/DL (ref 3.4–5)
ALBUMIN SERPL-MCNC: 3.5 G/DL (ref 3.4–5)
ALP BLD-CCNC: 198 U/L (ref 40–129)
ALP BLD-CCNC: 234 U/L (ref 40–129)
ALT SERPL-CCNC: 41 U/L (ref 10–40)
ALT SERPL-CCNC: 47 U/L (ref 10–40)
ANION GAP SERPL CALCULATED.3IONS-SCNC: 16 MMOL/L (ref 3–16)
AST SERPL-CCNC: 34 U/L (ref 15–37)
AST SERPL-CCNC: 36 U/L (ref 15–37)
BILIRUB SERPL-MCNC: 1.3 MG/DL (ref 0–1)
BILIRUB SERPL-MCNC: 2 MG/DL (ref 0–1)
BILIRUBIN DIRECT: 0.8 MG/DL (ref 0–0.3)
BILIRUBIN DIRECT: 0.9 MG/DL (ref 0–0.3)
BILIRUBIN URINE: NEGATIVE
BILIRUBIN, INDIRECT: 0.5 MG/DL (ref 0–1)
BILIRUBIN, INDIRECT: 1.1 MG/DL (ref 0–1)
BLOOD, URINE: NEGATIVE
BUN BLDV-MCNC: 15 MG/DL (ref 7–20)
CALCIUM SERPL-MCNC: 9 MG/DL (ref 8.3–10.6)
CHLORIDE BLD-SCNC: 106 MMOL/L (ref 99–110)
CLARITY: CLEAR
CO2: 19 MMOL/L (ref 21–32)
COLOR: YELLOW
CREAT SERPL-MCNC: 0.7 MG/DL (ref 0.6–1.2)
EKG ATRIAL RATE: 97 BPM
EKG DIAGNOSIS: NORMAL
EKG P AXIS: 45 DEGREES
EKG P-R INTERVAL: 166 MS
EKG Q-T INTERVAL: 374 MS
EKG QRS DURATION: 80 MS
EKG QTC CALCULATION (BAZETT): 474 MS
EKG R AXIS: 15 DEGREES
EKG T AXIS: 38 DEGREES
EKG VENTRICULAR RATE: 97 BPM
GFR SERPL CREATININE-BSD FRML MDRD: >60 ML/MIN/{1.73_M2}
GLUCOSE BLD-MCNC: 141 MG/DL (ref 70–99)
GLUCOSE URINE: NEGATIVE MG/DL
KETONES, URINE: NEGATIVE MG/DL
LEUKOCYTE ESTERASE, URINE: NEGATIVE
MAGNESIUM: 2.5 MG/DL (ref 1.8–2.4)
MICROSCOPIC EXAMINATION: NORMAL
NITRITE, URINE: NEGATIVE
PH UA: 7 (ref 5–8)
POTASSIUM SERPL-SCNC: 4.2 MMOL/L (ref 3.5–5.1)
PROTEIN UA: NEGATIVE MG/DL
REPORT: NORMAL
SODIUM BLD-SCNC: 141 MMOL/L (ref 136–145)
SPECIFIC GRAVITY UA: 1.03 (ref 1–1.03)
TOTAL PROTEIN: 5.1 G/DL (ref 6.4–8.2)
TOTAL PROTEIN: 6.2 G/DL (ref 6.4–8.2)
URINE REFLEX TO CULTURE: NORMAL
URINE TYPE: NORMAL
UROBILINOGEN, URINE: 0.2 E.U./DL

## 2023-01-13 PROCEDURE — 94760 N-INVAS EAR/PLS OXIMETRY 1: CPT

## 2023-01-13 PROCEDURE — 1200000000 HC SEMI PRIVATE

## 2023-01-13 PROCEDURE — 6360000002 HC RX W HCPCS: Performed by: STUDENT IN AN ORGANIZED HEALTH CARE EDUCATION/TRAINING PROGRAM

## 2023-01-13 PROCEDURE — 2580000003 HC RX 258: Performed by: STUDENT IN AN ORGANIZED HEALTH CARE EDUCATION/TRAINING PROGRAM

## 2023-01-13 PROCEDURE — 6370000000 HC RX 637 (ALT 250 FOR IP): Performed by: PHYSICIAN ASSISTANT

## 2023-01-13 PROCEDURE — 6360000004 HC RX CONTRAST MEDICATION: Performed by: PHYSICIAN ASSISTANT

## 2023-01-13 PROCEDURE — 80076 HEPATIC FUNCTION PANEL: CPT

## 2023-01-13 PROCEDURE — 80048 BASIC METABOLIC PNL TOTAL CA: CPT

## 2023-01-13 PROCEDURE — 2500000003 HC RX 250 WO HCPCS: Performed by: STUDENT IN AN ORGANIZED HEALTH CARE EDUCATION/TRAINING PROGRAM

## 2023-01-13 PROCEDURE — 83735 ASSAY OF MAGNESIUM: CPT

## 2023-01-13 PROCEDURE — 81003 URINALYSIS AUTO W/O SCOPE: CPT

## 2023-01-13 PROCEDURE — 6360000002 HC RX W HCPCS: Performed by: INTERNAL MEDICINE

## 2023-01-13 PROCEDURE — 36415 COLL VENOUS BLD VENIPUNCTURE: CPT

## 2023-01-13 RX ORDER — SODIUM CHLORIDE 0.9 % (FLUSH) 0.9 %
5-40 SYRINGE (ML) INJECTION EVERY 12 HOURS SCHEDULED
Status: DISCONTINUED | OUTPATIENT
Start: 2023-01-13 | End: 2023-01-16 | Stop reason: HOSPADM

## 2023-01-13 RX ORDER — ACETAMINOPHEN 650 MG/1
650 SUPPOSITORY RECTAL EVERY 6 HOURS PRN
Status: DISCONTINUED | OUTPATIENT
Start: 2023-01-13 | End: 2023-01-16 | Stop reason: HOSPADM

## 2023-01-13 RX ORDER — CIPROFLOXACIN 2 MG/ML
400 INJECTION, SOLUTION INTRAVENOUS EVERY 12 HOURS
Status: DISCONTINUED | OUTPATIENT
Start: 2023-01-13 | End: 2023-01-16 | Stop reason: HOSPADM

## 2023-01-13 RX ORDER — ONDANSETRON HYDROCHLORIDE 8 MG/1
8 TABLET, FILM COATED ORAL EVERY 8 HOURS PRN
COMMUNITY

## 2023-01-13 RX ORDER — SODIUM CHLORIDE 9 MG/ML
INJECTION, SOLUTION INTRAVENOUS CONTINUOUS
Status: DISCONTINUED | OUTPATIENT
Start: 2023-01-13 | End: 2023-01-13

## 2023-01-13 RX ORDER — POLYETHYLENE GLYCOL 3350 17 G/17G
17 POWDER, FOR SOLUTION ORAL DAILY PRN
Status: DISCONTINUED | OUTPATIENT
Start: 2023-01-13 | End: 2023-01-16 | Stop reason: HOSPADM

## 2023-01-13 RX ORDER — ACETAMINOPHEN 325 MG/1
650 TABLET ORAL EVERY 6 HOURS PRN
Status: DISCONTINUED | OUTPATIENT
Start: 2023-01-13 | End: 2023-01-16 | Stop reason: HOSPADM

## 2023-01-13 RX ORDER — CETIRIZINE HYDROCHLORIDE 10 MG/1
10 TABLET ORAL DAILY
COMMUNITY

## 2023-01-13 RX ORDER — ONDANSETRON 2 MG/ML
4 INJECTION INTRAMUSCULAR; INTRAVENOUS EVERY 6 HOURS PRN
Status: DISCONTINUED | OUTPATIENT
Start: 2023-01-13 | End: 2023-01-16 | Stop reason: HOSPADM

## 2023-01-13 RX ORDER — SODIUM CHLORIDE AND POTASSIUM CHLORIDE 150; 900 MG/100ML; MG/100ML
INJECTION, SOLUTION INTRAVENOUS CONTINUOUS
Status: DISCONTINUED | OUTPATIENT
Start: 2023-01-13 | End: 2023-01-14

## 2023-01-13 RX ORDER — ONDANSETRON 4 MG/1
4 TABLET, ORALLY DISINTEGRATING ORAL EVERY 8 HOURS PRN
Status: DISCONTINUED | OUTPATIENT
Start: 2023-01-13 | End: 2023-01-16 | Stop reason: HOSPADM

## 2023-01-13 RX ORDER — METRONIDAZOLE 500 MG/100ML
500 INJECTION, SOLUTION INTRAVENOUS EVERY 8 HOURS
Status: DISCONTINUED | OUTPATIENT
Start: 2023-01-13 | End: 2023-01-14

## 2023-01-13 RX ORDER — SODIUM CHLORIDE 0.9 % (FLUSH) 0.9 %
5-40 SYRINGE (ML) INJECTION PRN
Status: DISCONTINUED | OUTPATIENT
Start: 2023-01-13 | End: 2023-01-16 | Stop reason: HOSPADM

## 2023-01-13 RX ORDER — ENOXAPARIN SODIUM 100 MG/ML
40 INJECTION SUBCUTANEOUS DAILY
Status: DISCONTINUED | OUTPATIENT
Start: 2023-01-13 | End: 2023-01-16 | Stop reason: HOSPADM

## 2023-01-13 RX ORDER — SODIUM CHLORIDE 9 MG/ML
INJECTION, SOLUTION INTRAVENOUS PRN
Status: DISCONTINUED | OUTPATIENT
Start: 2023-01-13 | End: 2023-01-16 | Stop reason: HOSPADM

## 2023-01-13 RX ORDER — LACTULOSE 10 G/15ML
10 SOLUTION ORAL DAILY
COMMUNITY

## 2023-01-13 RX ADMIN — ENOXAPARIN SODIUM 40 MG: 100 INJECTION SUBCUTANEOUS at 08:39

## 2023-01-13 RX ADMIN — CIPROFLOXACIN 400 MG: 2 INJECTION, SOLUTION INTRAVENOUS at 02:45

## 2023-01-13 RX ADMIN — SODIUM CHLORIDE: 9 INJECTION, SOLUTION INTRAVENOUS at 02:43

## 2023-01-13 RX ADMIN — IOPAMIDOL 75 ML: 755 INJECTION, SOLUTION INTRAVENOUS at 00:02

## 2023-01-13 RX ADMIN — CIPROFLOXACIN 400 MG: 2 INJECTION, SOLUTION INTRAVENOUS at 15:17

## 2023-01-13 RX ADMIN — POTASSIUM CHLORIDE 40 MEQ: 1500 TABLET, EXTENDED RELEASE ORAL at 00:32

## 2023-01-13 RX ADMIN — POTASSIUM CHLORIDE AND SODIUM CHLORIDE: 900; 150 INJECTION, SOLUTION INTRAVENOUS at 12:31

## 2023-01-13 RX ADMIN — METRONIDAZOLE 500 MG: 500 INJECTION, SOLUTION INTRAVENOUS at 08:39

## 2023-01-13 RX ADMIN — METRONIDAZOLE 500 MG: 500 INJECTION, SOLUTION INTRAVENOUS at 03:49

## 2023-01-13 RX ADMIN — METRONIDAZOLE 500 MG: 500 INJECTION, SOLUTION INTRAVENOUS at 18:12

## 2023-01-13 RX ADMIN — SODIUM CHLORIDE, PRESERVATIVE FREE 10 ML: 5 INJECTION INTRAVENOUS at 08:41

## 2023-01-13 ASSESSMENT — ENCOUNTER SYMPTOMS
COUGH: 0
VOMITING: 0
BACK PAIN: 0
DIARRHEA: 0
BLOOD IN STOOL: 0
NAUSEA: 1
ABDOMINAL PAIN: 1
SHORTNESS OF BREATH: 0
COLOR CHANGE: 0

## 2023-01-13 NOTE — CARE COORDINATION
INITIAL CASE MANAGEMENT ASSESSMENT    Met with patient to assess possible discharge needs. Explained Case Management role/services. Living Situation: Lives with her spouse in a two story home with 3 DAHLIA. ADLs: Independent     DME: N/A    PT/OT Recs: No recommendations at this time. Active Services: N/A     Transportation: Active . Medications: [facility]    PCP: XAIVER Segura      HD/PD: N/A    PLAN/COMMENTS: Disharge plan is to return to home with spouse when medically ready for discharge. No needs identified. Family will provide transportation. Provided contact information for patient or family to call with any questions. Will follow and assist as needed.     Joseph MENA RN  Case Management  [phone]    Electronically signed by Joseph Hawley RN on 1/13/2023 at 4:05 PM

## 2023-01-13 NOTE — ACP (ADVANCE CARE PLANNING)
Advance Care Planning     Advance Care Planning Activator (Inpatient)  Conversation Note      Date of ACP Conversation: 1/13/2023     Conversation Conducted with: Patient with Decision Making Capacity    ACP Activator: Regine Champion 44 Decision Maker:     Current Designated Health Care Decision Maker:     Primary Decision Maker: Nyasia Wolff Spouse - [phone]    Secondary Decision Maker: Jenny Beasley Child - [phone]    Care Preferences    Ventilation: \"If you were in your present state of health and suddenly became very ill and were unable to breathe on your own, what would your preference be about the use of a ventilator (breathing machine) if it were available to you? \"      Would the patient desire the use of ventilator (breathing machine)?: yes    \"If your health worsens and it becomes clear that your chance of recovery is unlikely, what would your preference be about the use of a ventilator (breathing machine) if it were available to you? \"     Would the patient desire the use of ventilator (breathing machine)?: Unsure      Resuscitation  \"CPR works best to restart the heart when there is a sudden event, like a heart attack, in someone who is otherwise healthy. Unfortunately, CPR does not typically restart the heart for people who have serious health conditions or who are very sick. \"    \"In the event your heart stopped as a result of an underlying serious health condition, would you want attempts to be made to restart your heart (answer \"yes\" for attempt to resuscitate) or would you prefer a natural death (answer \"no\" for do not attempt to resuscitate)? \" yes       [] Yes   [x] No   Educated Patient / Viola Peraza regarding differences between Advance Directives and portable DNR orders.     Length of ACP Conversation in minutes:  5    Conversation Outcomes:  [x] ACP discussion completed  [] Existing advance directive reviewed with patient; no changes to patient's previously recorded wishes  [] New Advance Directive completed  [] Portable Do Not Rescitate prepared for Provider review and signature  [] POLST/POST/MOLST/MOST prepared for Provider review and signature      Follow-up plan:    [x] Schedule follow-up conversation to continue planning  [] Referred individual to Provider for additional questions/concerns   [] Advised patient/agent/surrogate to review completed ACP document and update if needed with changes in condition, patient preferences or care setting    [x] This note routed to one or more involved healthcare providers    Urszula CAROLINAN RN  Case Management  [phone]    Electronically signed by Urszula Javier, RN on 1/13/2023 at 4:08 PM

## 2023-01-13 NOTE — PLAN OF CARE
Problem: Discharge Planning  Goal: Discharge to home or other facility with appropriate resources  1/13/2023 1347 by Nico Cage RN  Outcome: Progressing    Flowsheets (Taken 1/13/2023 0249)  Discharge to home or other facility with appropriate resources:   Identify barriers to discharge with patient and caregiver   Arrange for needed discharge resources and transportation as appropriate   Identify discharge learning needs (meds, wound care, etc)     Problem: Safety - Adult  Goal: Free from fall injury  1/13/2023 1347 by Nico Cage RN  Outcome: Progressing       Problem: Musculoskeletal - Adult  Goal: Return mobility to safest level of function  1/13/2023 1347 by Nico Cage RN  Outcome: Progressing    Goal: Maintain proper alignment of affected body part  1/13/2023 1347 by Nico Cage RN  Outcome: Progressing    Goal: Return ADL status to a safe level of function  1/13/2023 1347 by Nico Cage RN  Outcome: Progressing    Problem: Gastrointestinal - Adult  Goal: Maintains or returns to baseline bowel function  1/13/2023 1347 by Nico Cage RN  Outcome: Progressing    Goal: Maintains adequate nutritional intake  1/13/2023 1347 by Nico Cage RN  Outcome: Progressing    Goal: Establish and maintain optimal ostomy function  1/13/2023 1347 by Nico Cage RN  Outcome: Progressing       Problem: Genitourinary - Adult  Goal: Absence of urinary retention  1/13/2023 1347 by Nico Cage RN  Outcome: Progressing    Goal: Urinary catheter remains patent  1/13/2023 1347 by Nico Cage RN  Outcome: Progressing       Problem: Nutrition Deficit:  Goal: Optimize nutritional status  Outcome: Progressing

## 2023-01-13 NOTE — H&P
Internal Medicine History and Physical  CC:chills  HPI:63 yo female with htn, recently diagnosed ca of head of pancreas who was taking zofran regularly and decided to stop it and see if she needed it. She had a stent placed several weeks ago and has been waiting on her surgery appt, her jaundice has largely resolved. Last night she started to have shaking chills and temp over 102 and she vomited. She came to the ER. She was cultured up and started on cetriaxone and flagyl and zofran. This morning she feels improved. Her fever and chills are gone and her nausea is controlled. She has no cough. , no uri sxs, no dysuria or urgenccy, and has LUQ abdo discomfort which is unchanged. Principal Problem:    Intractable nausea and vomiting  Active Problems:    Fever and chills    Malignant neoplasm of head of pancreas (HCC)    Hypertension  Resolved Problems:    * No resolved hospital problems.  *    Past Medical History:   Diagnosis Date    Adenomatous colon polyp     [place] every 5 years    adolescent scoliosis     mostly lumbar    Basal cell carcinoma of skin     sees derm    Breast CA ([facility] [address] 09/17/2015    33 treatments of radiation 2011    Breast cancer ([facility] [address] 2010    right: stage 0, lumpectomy, xrt, h/onc Dr Naomy [facility]    Chronic throat pain 2020    resolved with therapy    Elevated BP without diagnosis of hypertension     white coat, runs 120-130/60-77 at home    High cholesterol 2019    cv risk 21%    Osteopenia after menopause 2018    on calcium and vit D    Prolonged emergence from general anesthesia       Past Surgical History:   Procedure Laterality Date    APPENDECTOMY      at the same time as hys    BREAST LUMPECTOMY Right     CHOLECYSTECTOMY, LAPAROSCOPIC      post op infection    ERCP N/A 12/7/2022    ERCP BILIARY BRUSHING performed by Roslyn Goldberg, MD at [id number]    ERCP N/A 12/7/2022    ERCP STENT INSERTION performed by Roslyn Goldberg, MD at [facility] [place] HYSTERECTOMY, TOTAL ABDOMINAL (CERVIX REMOVED)      fibroids still has ovaries    TONSILLECTOMY      as a child    UPPER GASTROINTESTINAL ENDOSCOPY N/A 2022    [facility]      Medications Prior to Admission: atorvastatin (LIPITOR) 20 MG tablet, Take 20 mg by mouth daily (Patient not taking: Reported on 2023)  Calcium Carbonate-Vitamin D (CALCIUM-VITAMIN D3 PO), Take 600 mg by mouth daily (Patient not taking: No sig reported)  Multiple Vitamins-Minerals (MULTIVITAMIN ADULT PO), Take by mouth (Patient not taking: No sig reported)  Allergies   Allergen Reactions    Fosamax [Alendronate]      Throat irritation    Iodine Hives    Pcn [Penicillins] Hives    Pseudoephedrine Hives    Cephalosporins Nausea And Vomiting      Social History     Tobacco Use    Smoking status: Never    Smokeless tobacco: Never    Tobacco comments:     father smoked when she was child   Substance Use Topics    Alcohol use: Not Currently     Alcohol/week: 0.0 - 2.0 standard drinks      Family History   Problem Relation Age of Onset    Atrial Fibrillation Mother 80        lived until 80.8    Hypertension Mother     Heart Failure Mother     Pacemaker Father 79         76    Heart Failure Father     Atrial Fibrillation Sister     Thyroid Disease Sister     Hypertension Sister     Breast Cancer Sister     Dementia Sister     Other Sister         fell and brain bleed    Atrial Fibrillation Sister     Dementia Maternal Aunt         Prior to Admission medications    Medication Sig Start Date End Date Taking?  Authorizing Provider   atorvastatin (LIPITOR) 20 MG tablet Take 20 mg by mouth daily  Patient not taking: Reported on 2023    Historical Provider, MD   Calcium Carbonate-Vitamin D (CALCIUM-VITAMIN D3 PO) Take 600 mg by mouth daily  Patient not taking: No sig reported    Historical Provider, MD   Multiple Vitamins-Minerals (MULTIVITAMIN ADULT PO) Take by mouth  Patient not taking: No sig reported    Historical Provider, MD     Review of Systems  A comprehensive review of systems was negative except for:fever chills    Objective:     Patient Vitals for the past 8 hrs:   BP Temp Temp src Pulse Resp SpO2 Weight   01/13/23 0812 115/70 97.9 °F (36.6 °C) Oral 86 16 96 % --   01/13/23 0812 -- -- -- -- 16 97 % --   01/13/23 0428 (!) 100/57 98.3 °F (36.8 °C) Oral 84 16 96 % --   01/13/23 0426 -- -- -- -- -- -- 138 lb 10.7 oz (62.9 kg)   01/13/23 0137 110/77 98.2 °F (36.8 °C) Oral 99 16 97 % --     No intake/output data recorded. I/O this shift:   In: 596.7 [I.V.:278; IV Piggyback:318.7]  Out: -     VITALS:  /70   Pulse 86   Temp 97.9 °F (36.6 °C) (Oral)   Resp 16   Wt 138 lb 10.7 oz (62.9 kg)   LMP  (LMP Unknown)   SpO2 96%   BMI 23.80 kg/m²   General Appearance: alert and oriented to person, place and time, well-developed and well-nourished, in no acute distress  Skin: warm and dry, no rash or erythema no obvious jaundice  Head: normocephalic and atraumatic  Eyes: conjunctivae normal and sclera anicteric  ENT: hearing grossly normal bilaterally and sinuses non-tender  Neck: neck supple and non tender without mass, no thyromegaly or thyroid nodules, no cervical lymphadenopathy   Pulmonary/Chest: clear to auscultation bilaterally- no wheezes, rales or rhonchi, normal air movement, no respiratory distress  Cardiovascular: normal rate, normal S1 and S2, no gallops and no carotid bruits  Abdomen: soft, non-tender, non-distended, normal bowel sounds, no masses or organomegaly temder :LUQ  Extremities: no cyanosis, clubbing or edema  Musculoskeletal: no swollen joints and no tender joints,  Neurologic: coordination normal and speech normal, moves all 4 extremities      Data Review:     Recent Results (from the past 24 hour(s))   CBC with Auto Differential    Collection Time: 01/12/23 10:27 PM   Result Value Ref Range    WBC 14.2 (H) 4.0 - 11.0 K/uL    RBC 4.05 4.00 - 5.20 M/uL Hemoglobin 12.0 12.0 - 16.0 g/dL    Hematocrit 35.9 (L) 36.0 - 48.0 %    MCV 88.7 80.0 - 100.0 fL    MCH 29.5 26.0 - 34.0 pg    MCHC 33.3 31.0 - 36.0 g/dL    RDW 15.1 12.4 - 15.4 %    Platelets 227 214 - 085 K/uL    MPV 9.7 5.0 - 10.5 fL    Neutrophils % 94.2 %    Lymphocytes % 3.1 %    Monocytes % 2.3 %    Eosinophils % 0.1 %    Basophils % 0.3 %    Neutrophils Absolute 13.4 (H) 1.7 - 7.7 K/uL    Lymphocytes Absolute 0.4 (L) 1.0 - 5.1 K/uL    Monocytes Absolute 0.3 0.0 - 1.3 K/uL    Eosinophils Absolute 0.0 0.0 - 0.6 K/uL    Basophils Absolute 0.0 0.0 - 0.2 K/uL   Comprehensive Metabolic Panel    Collection Time: 01/12/23 10:27 PM   Result Value Ref Range    Sodium 134 (L) 136 - 145 mmol/L    Potassium 3.2 (L) 3.5 - 5.1 mmol/L    Chloride 100 99 - 110 mmol/L    CO2 20 (L) 21 - 32 mmol/L    Anion Gap 14 3 - 16    Glucose 103 (H) 70 - 99 mg/dL    BUN 18 7 - 20 mg/dL    Creatinine 0.6 0.6 - 1.2 mg/dL    Est, Glom Filt Rate >60 >60    Calcium 9.6 8.3 - 10.6 mg/dL    Total Protein 6.2 (L) 6.4 - 8.2 g/dL    Albumin 3.3 (L) 3.4 - 5.0 g/dL    Albumin/Globulin Ratio 1.1 1.1 - 2.2    Total Bilirubin 2.0 (H) 0.0 - 1.0 mg/dL    Alkaline Phosphatase 238 (H) 40 - 129 U/L    ALT 45 (H) 10 - 40 U/L    AST 36 15 - 37 U/L   Lipase    Collection Time: 01/12/23 10:27 PM   Result Value Ref Range    Lipase 9.0 (L) 13.0 - 60.0 U/L   Rapid influenza A/B antigens    Collection Time: 01/12/23 10:27 PM    Specimen: Nasopharyngeal   Result Value Ref Range    Rapid Influenza A Ag Negative Negative    Rapid Influenza B Ag Negative Negative   COVID-19, Rapid    Collection Time: 01/12/23 10:27 PM    Specimen: Nasopharyngeal Swab   Result Value Ref Range    SARS-CoV-2, NAAT Not Detected Not Detected   Hepatic Function Panel    Collection Time: 01/12/23 10:27 PM   Result Value Ref Range    Total Protein 6.2 (L) 6.4 - 8.2 g/dL    Albumin 3.5 3.4 - 5.0 g/dL    Alkaline Phosphatase 234 (H) 40 - 129 U/L    ALT 47 (H) 10 - 40 U/L    AST 36 15 - 37 U/L Total Bilirubin 2.0 (H) 0.0 - 1.0 mg/dL    Bilirubin, Direct 0.9 (H) 0.0 - 0.3 mg/dL    Bilirubin, Indirect 1.1 (H) 0.0 - 1.0 mg/dL   Lactic Acid    Collection Time: 01/12/23 11:00 PM   Result Value Ref Range    Lactic Acid 1.8 0.4 - 2.0 mmol/L   EKG 12 Lead    Collection Time: 01/12/23 11:08 PM   Result Value Ref Range    Ventricular Rate 97 BPM    Atrial Rate 97 BPM    P-R Interval 166 ms    QRS Duration 80 ms    Q-T Interval 374 ms    QTc Calculation (Bazett) 474 ms    P Axis 45 degrees    R Axis 15 degrees    T Axis 38 degrees    Diagnosis       Normal sinus rhythmInferior infarctConfirmed by GUS ZHENG, Norman Nissen ([id number]) on 1/13/2023 7:56:11 AM   Urinalysis with Reflex to Culture    Collection Time: 01/13/23  8:55 AM    Specimen: Urine, straight catheter   Result Value Ref Range    Urine Type NotGiven       CT ABDOMEN PELVIS W IV CONTRAST Additional Contrast? None    Result Date: 1/13/2023  1. Redemonstration of infiltrative pancreatic head mass with biliary stent in place. Unchanged appearance of mild biliary enlargement with pneumobilia. 2.  No acute inflammatory process identified. XR CHEST PORTABLE    Result Date: 1/12/2023  No acute airspace disease identified. Assessment:     Principal Problem:    Intractable nausea and vomiting  Plan: resolved with zofran will continue this  Active Problems:    Fever and chills  Plan: source of fever not clear.   Await bcx 2 and still to send urine    Malignant neoplasm of head of pancreas ([facility] [address]  Plan: good surgical candidate supposed to be seen soon    Hypertension  Plan: not currently            Regino Aase, MD

## 2023-01-13 NOTE — PROGRESS NOTES
Medication Reconciliation    List of medications patient is currently taking is complete. Source of information: 1.  Conversation with patient at bedside                                      2. EPIC records      Allergies  Fosamax [alendronate], Iodine, Pcn [penicillins], Pseudoephedrine, and Cephalosporins      Merced Bautista [facility]   1/13/2023  2:17 PM

## 2023-01-13 NOTE — ED PROVIDER NOTES
[address] [facility]        Pt Name: Kaylee Borja  MRN: [id number]  [place] 1945  Date of evaluation: 1/12/2023  Provider: CORAL Clement  PCP: Edi Pinto MD  Note Started: 1:08 AM EST 1/13/23      AD. I have evaluated this patient. My supervising physician was available for consultation. CHIEF COMPLAINT       Chief Complaint   Patient presents with    Fever     Pt came in after starting with a fever tonight pt was recently dx with pancreatic cancer in nov    Nausea       HISTORY OF PRESENT ILLNESS: 1 or more Elements     History from : Patient and Family son Filiberto Valencia and     Limitations to history : None    Kaylee Borja is a 65 y.o. female who presents via EMS with a fever. Per the  her fever was over 102 °F at home. He tells me she was very weak and chilling. The patient tells me she felt like she was having some abdominal bloating and discomfort for the past couple of days some nausea. She is been taking Zofran but did not take it today. She has a newly diagnosed pancreatic mass. She has a personal history of breast cancer did radiation remotely. She has appointment with Dr. Augusto Regalado of oncology tomorrow. She took some Tylenol for the fever this evening and had a normal bowel movement today. Pancreatic mass was diagnosed in November. She had some abnormal liver enzymes and bilirubin she had a biliary stent placed in December with Dr. Zaheer Amaya. She tells me that her skin color has improved her stools and urine color have also improved. No known sick contacts no cough not short of breath. Nursing Notes were all reviewed and agreed with or any disagreements were addressed in the HPI. REVIEW OF SYSTEMS :      Review of Systems   Constitutional:  Positive for fatigue and fever. HENT:  Negative for congestion. Respiratory:  Negative for cough and shortness of breath.     Cardiovascular:  Negative for chest pain. Gastrointestinal:  Positive for abdominal pain and nausea. Negative for blood in stool, diarrhea and vomiting. Musculoskeletal:  Negative for back pain. Skin:  Negative for color change and wound. Neurological:  Positive for weakness. Psychiatric/Behavioral:  Negative for agitation, behavioral problems and confusion. Positives and Pertinent negatives as per HPI.      SURGICAL HISTORY     Past Surgical History:   Procedure Laterality Date    APPENDECTOMY      at the same time as hys    BREAST LUMPECTOMY Right     CHOLECYSTECTOMY, LAPAROSCOPIC      post op infection    ERCP N/A 2022    ERCP BILIARY BRUSHING performed by Shahram Gao MD at [address] [facility]    ERCP N/A 2022    ERCP STENT INSERTION performed by Shahram Gao MD at [facility], TOTAL ABDOMINAL (CERVIX REMOVED)      fibroids still has ovaries    TONSILLECTOMY      as a child    UPPER GASTROINTESTINAL ENDOSCOPY N/A 2022    EGD W/EUS FNA [facility] [address] [facility]       Previous Medications    ATORVASTATIN (LIPITOR) 20 MG TABLET    Take 20 mg by mouth daily    CALCIUM CARBONATE-VITAMIN D (CALCIUM-VITAMIN D3 PO)    Take 600 mg by mouth daily    MULTIPLE VITAMINS-MINERALS (MULTIVITAMIN ADULT PO)    Take by mouth       ALLERGIES     Fosamax [alendronate], Iodine, Pcn [penicillins], Pseudoephedrine, and Cephalosporins    FAMILYHISTORY       Family History   Problem Relation Age of Onset    Atrial Fibrillation Mother 80        lived until 80.8    Hypertension Mother     Heart Failure Mother     Pacemaker Father 79         76    Heart Failure Father     Atrial Fibrillation Sister     Thyroid Disease Sister     Hypertension Sister     Breast Cancer Sister     Dementia Sister     Other Sister         fell and brain bleed    Atrial Fibrillation Sister     Dementia Maternal Aunt         SOCIAL HISTORY       Social History     Tobacco Use Smoking status: Never    Smokeless tobacco: Never    Tobacco comments:     father smoked when she was child   Vaping Use    Vaping Use: Never used   Substance Use Topics    Alcohol use: Not Currently     Alcohol/week: 0.0 - 2.0 standard drinks    Drug use: Never       SCREENINGS        Tanvi Coma Scale  Eye Opening: Spontaneous  Best Verbal Response: Oriented  Best Motor Response: Obeys commands  Tanvi Coma Scale Score: 15                CIWA Assessment  BP: 137/81  Heart Rate: 100           PHYSICAL EXAM  1 or more Elements     ED Triage Vitals [01/12/23 2219]   BP Temp Temp Source Heart Rate Resp SpO2 Height Weight   137/81 99.6 °F (37.6 °C) Oral 100 16 96 % -- 143 lb 4.8 oz (65 kg)       Physical Exam  Vitals and nursing note reviewed. Constitutional:       General: She is not in acute distress. Appearance: Normal appearance. HENT:      Head: Normocephalic and atraumatic. Mouth/Throat:      Mouth: Mucous membranes are moist.   Eyes:      Pupils: Pupils are equal, round, and reactive to light. Cardiovascular:      Rate and Rhythm: Normal rate. Pulmonary:      Effort: Pulmonary effort is normal. No respiratory distress. Abdominal:      Tenderness: There is no abdominal tenderness. There is no guarding or rebound. Musculoskeletal:         General: Normal range of motion. Cervical back: Normal range of motion. Skin:     Coloration: Skin is jaundiced. Neurological:      General: No focal deficit present. Mental Status: She is alert and oriented to person, place, and time.    Psychiatric:         Mood and Affect: Mood normal.         Behavior: Behavior normal.       DIAGNOSTIC RESULTS   LABS:    Labs Reviewed   CBC WITH AUTO DIFFERENTIAL - Abnormal; Notable for the following components:       Result Value    WBC 14.2 (*)     Hematocrit 35.9 (*)     Neutrophils Absolute 13.4 (*)     Lymphocytes Absolute 0.4 (*)     All other components within normal limits   COMPREHENSIVE METABOLIC PANEL - Abnormal; Notable for the following components:    Sodium 134 (*)     Potassium 3.2 (*)     CO2 20 (*)     Glucose 103 (*)     Total Protein 6.2 (*)     Albumin 3.3 (*)     Total Bilirubin 2.0 (*)     Alkaline Phosphatase 238 (*)     ALT 45 (*)     All other components within normal limits   LIPASE - Abnormal; Notable for the following components:    Lipase 9.0 (*)     All other components within normal limits   HEPATIC FUNCTION PANEL - Abnormal; Notable for the following components: Total Protein 6.2 (*)     Alkaline Phosphatase 234 (*)     ALT 47 (*)     Total Bilirubin 2.0 (*)     Bilirubin, Direct 0.9 (*)     Bilirubin, Indirect 1.1 (*)     All other components within normal limits   RAPID INFLUENZA A/B ANTIGENS   COVID-19, RAPID   CULTURE, BLOOD 2   CULTURE, BLOOD 1   LACTIC ACID   URINALYSIS WITH REFLEX TO CULTURE       When ordered only abnormal lab results are displayed. All other labs were within normal range or not returned as of this dictation. EKG: When ordered, EKG's are interpreted by the Emergency Department Physician in the absence of a cardiologist.  Please see their note for interpretation of EKG. RADIOLOGY:   Non-plain film images such as CT, Ultrasound and MRI are read by the radiologist. Plain radiographic images are visualized and preliminarily interpreted by the ED Provider with the below findings:    Interpretation per the Radiologist below, if available at the time of this note:    CT ABDOMEN PELVIS W IV CONTRAST Additional Contrast? None   Final Result   1. Redemonstration of infiltrative pancreatic head mass with biliary stent   in place. Unchanged appearance of mild biliary enlargement with pneumobilia. 2.  No acute inflammatory process identified. XR CHEST PORTABLE   Final Result   No acute airspace disease identified.            CT ABDOMEN PELVIS W IV CONTRAST Additional Contrast? None    Result Date: 1/13/2023  EXAMINATION: CT OF THE ABDOMEN AND PELVIS WITH CONTRAST 1/12/2023 11:34 pm TECHNIQUE: CT of the abdomen and pelvis was performed with the administration of intravenous contrast. Multiplanar reformatted images are provided for review. Automated exposure control, iterative reconstruction, and/or weight based adjustment of the mA/kV was utilized to reduce the radiation dose to as low as reasonably achievable. COMPARISON: CT 12/01/2022. PET-CT 12/20/2022. HISTORY: ORDERING SYSTEM PROVIDED HISTORY: abd pain, nausea, hx of pancreatic ca TECHNOLOGIST PROVIDED HISTORY: Additional Contrast?->None Reason for exam:->abd pain, nausea, hx of pancreatic ca Reason for Exam: abd pain, nausea, hx of pancreatic ca FINDINGS: Lower Chest: No acute findings. Organs: Pancreatic head mass with metallic biliary stent in place. Compression of the portal splenic confluence by the mass and extension into the root of the mesentery again noted. Mild prominence of the biliary tree with pneumobilia appears grossly unchanged. Cystic lesion in the left hepatic lobe again demonstrated. Status post cholecystectomy. The spleen, adrenals and kidneys reveal no acute findings. GI/Bowel: There is no bowel dilatation or wall thickening identified. Diverticulosis. Pelvis: No acute findings. Peritoneum/Retroperitoneum: No free air, ascites or discrete lymphadenopathy. Bones/Soft Tissues: No new findings. 1.  Redemonstration of infiltrative pancreatic head mass with biliary stent in place. Unchanged appearance of mild biliary enlargement with pneumobilia. 2.  No acute inflammatory process identified. XR CHEST PORTABLE    Result Date: 1/12/2023  EXAMINATION: ONE XRAY VIEW OF THE CHEST 1/12/2023 11:17 pm COMPARISON: None. HISTORY: ORDERING SYSTEM PROVIDED HISTORY: fever TECHNOLOGIST PROVIDED HISTORY: Reason for exam:->fever Reason for Exam: fever FINDINGS: The cardiomediastinal silhouette is within normal limits. There is no consolidation, pneumothorax or evidence for edema.  No evidence for effusion. No acute osseous abnormality is identified. No acute airspace disease identified. No results found. PROCEDURES   Unless otherwise noted below, none     Procedures    CRITICAL CARE TIME (.cctime)   I performed a total Critical Care time of  15 minutes, excluding separately reportable procedures. There was a high probability of clinically significant/life threatening deterioration in the patient's condition which required my urgent intervention. Not limited to multiple reexaminations, discussions with attending physician and consultants. PAST MEDICAL HISTORY      has a past medical history of Adenomatous colon polyp, adolescent scoliosis, Basal cell carcinoma of skin, Breast CA ([facility] [address] (09/17/2015), Breast cancer ([facility] [address] (2010), Chronic throat pain (2020), Elevated BP without diagnosis of hypertension, High cholesterol (2019), Osteopenia after menopause (2018), and Prolonged emergence from general anesthesia. EMERGENCY DEPARTMENT COURSE and DIFFERENTIAL DIAGNOSIS/MDM:   Vitals:    Vitals:    01/12/23 2219   BP: 137/81   Pulse: 100   Resp: 16   Temp: 99.6 °F (37.6 °C)   TempSrc: Oral   SpO2: 96%   Weight: 143 lb 4.8 oz (65 kg)       Patient was given the following medications:  Medications   ondansetron (ZOFRAN) injection 4 mg (4 mg IntraVENous Given 1/12/23 2339)   0.9 % sodium chloride bolus (0 mLs IntraVENous Stopped 1/13/23 0004)   diphenhydrAMINE (BENADRYL) injection 25 mg (25 mg IntraVENous Given 1/12/23 2327)   iopamidol (ISOVUE-370) 76 % injection 75 mL (75 mLs IntraVENous Given 1/13/23 0002)   potassium chloride (KLOR-CON M) extended release tablet 40 mEq (40 mEq Oral Given 1/13/23 0032)             Is this patient to be included in the SEP-1 Core Measure due to severe sepsis or septic shock?    No   Exclusion criteria - the patient is NOT to be included for SEP-1 Core Measure due to:  May have criteria for sepsis, but does not meet criteria for severe sepsis or septic shock    CONSULTS: (Who and What was discussed)  IP CONSULT TO GI  Discussion with Other Profesionals : Admitting Team Dr. Osei Butt    Social Determinants : None    Records Reviewed : Inpatient Notes   and Outpatient Notes      CC/HPI Summary, DDx, ED Course, and Reassessment: Patient presents with a temp of 99.6. Borderline tachycardic. She received Tylenol prior for her fever of 102. She was weak nauseous and having some abdominal distention or discomfort. She is currently being evaluated for treatment for pancreatic mass. She does have an elevated white blood cell count of 14.2 but a normal lactic acid. Was given fluids and Zofran. Resting comfortably on serial examination. Agreeable to admission. I am the Primary Clinician of Record. FINAL IMPRESSION      1. Fever, unspecified fever cause    2. Nausea    3. General weakness          DISPOSITION/PLAN     DISPOSITION Admitted 01/13/2023 12:41:00 AM      PATIENT REFERRED TO:  No follow-up provider specified.     DISCHARGE MEDICATIONS:  New Prescriptions    No medications on file       DISCONTINUED MEDICATIONS:  Discontinued Medications    No medications on file              (Please note that portions of this note were completed with a voice recognition program.  Efforts were made to edit the dictations but occasionally words are mis-transcribed.)    Alea Crockett (electronically signed)           CORAL Crockett  01/13/23 0111

## 2023-01-13 NOTE — PROGRESS NOTES
Pt admitted to room 4272 from ER. Pt alert and oriented x 4, vss. Pt denies pain at time. Orientation to room performed and instructions were provided for call light system. Call light and bedside table within pt reach. Will continue to monitor.

## 2023-01-13 NOTE — PROGRESS NOTES
Comprehensive Nutrition Assessment    Type and Reason for Visit:  Initial, Positive Nutrition Screen    Nutrition Recommendations/Plan:   NPO per GI  Will order ONS when diet resumes     Malnutrition Assessment:  Malnutrition Status: At risk for malnutrition (Comment) (01/13/23 1307)    Context:  Acute Illness       Nutrition Assessment:    Positive nutrition screen. Admitted with nausea/vomiting, PMH includes recent pancreatic cancer dx, breast cancer, HLD, HTN. Current diet NPO. Pt drinks ONS at home. Reports her appetite is improving. Some weight loss noted in chart- 8.6% over 9 months which is not significant. No physical signs of malnutrition noted. Will order ONS when diet resumes. Nutrition Related Findings:    No BM noted. Trace BLE edema. Labs reviewed. Wound Type: None       Current Nutrition Intake & Therapies:    Average Meal Intake: NPO  Average Supplements Intake: NPO  Diet NPO    Anthropometric Measures:  Height: 5' 4\" (162.6 cm)  Ideal Body Weight (IBW): 120 lbs (55 kg)    Current Body Weight: 138 lb 14.2 oz (63 kg), 115.7 % IBW.  Weight Source: Bed Scale  Current BMI (kg/m2): 23.8  BMI Categories: Normal Weight (BMI 22.0 to 24.9) age over 72    Estimated Daily Nutrient Needs:  Energy Requirements Based On: Kcal/kg  Weight Used for Energy Requirements: Current  Energy (kcal/day): 5173-5471 kcal (25-30 kcal/kg)  Weight Used for Protein Requirements: Current  Protein (g/day): 63-76 gm (1.0-1.2 g/kg)  Method Used for Fluid Requirements: 1 ml/kcal    Nutrition Diagnosis:   Inadequate oral intake related to inadequate protein-energy intake as evidenced by NPO or clear liquid status due to medical condition    Nutrition Interventions:   Food and/or Nutrient Delivery: Continue NPO  Nutrition Education/Counseling: Education not indicated  Coordination of Nutrition Care: Continue to monitor while inpatient    Goals:  Goals: Initiate PO diet, by next RD assessment    Nutrition Monitoring and Evaluation: Behavioral-Environmental Outcomes: None Identified  Food/Nutrient Intake Outcomes: Diet Advancement/Tolerance  Physical Signs/Symptoms Outcomes: Biochemical Data, Nutrition Focused Physical Findings, Skin, Weight, GI Status, Nausea or Vomiting, Meal Time Behavior    Discharge Planning:     Too soon to determine     Ryananmol Ken, [address] [place] [address],   Contact: [phone]

## 2023-01-13 NOTE — CONSULTS
GASTROENTEROLOGY INPATIENT CONSULTATION        IDENTIFYING DATA/REASON FOR CONSULTATION   PATIENT:  Jennifer Lowe  MRN:  [id number]  ADMIT DATE: 1/12/2023  TIME OF EVALUATION: 1/13/2023 9:54 AM  HOSPITAL STAY:   LOS: 0 days     REASON FOR CONSULTATION:  nausea, recent biliary stent placement    HISTORY OF PRESENT ILLNESS   Jennifer Lowe is a 65 y.o. female with a PMH of pancreatic adenocarcinoma with obstructive jaundice, breast cancer, HLD, HTN who presented on 1/12/2023 with acute onset of chills, fever, nausea, vomiting. Symptoms started last night. Reports a fever of 103. Recently diagnosed with pancreatic adenocarcinoma last month after presenting with jaundice. CT 12/1 showed hepatic cyst at 1.6 cm, biliary and pancreatic ductal dilation and a pancreatic neck lesion. She underwent EUS/FNA, and ERCP with sphincterotomy and biliary stent placement. On EUS the pancreatic mass was 3.2 cm x 2.18 cm with abutment to the SMV, no involvement in the celiac artery or SMA. Cytology and brush biopsies were positive for adenocarcinoma. She has been seen by oncology, Dr. Johnnie Monroy. Per patient PET scan showed no evidence of metastatic disease. She is waiting on appointment with surgical oncology which is scheduled for next Wednesday. She has been taking Zofran as needed for nausea. She reports jaundice has improved and is no longer passing carlos colored stools. Denies pruritus. This morning she states she is feeling better with no further nausea or vomiting. No fevers this morning. Denies abdominal pain. She was able to tolerate clear liquids this morning. White count 14.2, hgb 12, platelet count 085  Bilirubin 2.0 (from 5.9 on 12/1/2022)  Alk phos 234 (from 649 on 12/1/2022)  ALT 47 (from 285 on 12/1/2022)  AST 36 (from 153 on 12/1/2022)  Lipase 9  BUN 18, creatinine 0.6  Lactic acid 1.8    CT A/P with IV contrast redemonstrated infiltrative pancreatic head mass.   Biliary stent in place with mild biliary enlargement with pneumobilia, no acute inflammatory process identified. UA negative for infection  Chest x-ray with no acute airspace disease identified. Prior Endoscopic Evaluations:  1. T2N0Mx pancreas mass. FNA performed. 2.  Resultant biliary stricture. Biliary sphincterotomy performed, brushings obtained, and a fully covered metal Wallflex biliary stent was placed across the stricture.         PAST MEDICAL, SURGICAL, FAMILY, and SOCIAL HISTORY     Past Medical History:   Diagnosis Date    Adenomatous colon polyp     [place] every 5 years    adolescent scoliosis     mostly lumbar    Basal cell carcinoma of skin     sees derm    Breast CA ([facility] [address] 2015    33 treatments of radiation     Breast cancer ([facility] [address]     right: stage 0, lumpectomy, xrt, h/onc Dr Naomy [facility]    Chronic throat pain     resolved with therapy    Elevated BP without diagnosis of hypertension     white coat, runs 120-130/60-77 at home    High cholesterol 2019    cv risk 21%    Osteopenia after menopause 2018    on calcium and vit D    Prolonged emergence from general anesthesia      Past Surgical History:   Procedure Laterality Date    APPENDECTOMY      at the same time as hys    BREAST LUMPECTOMY Right     CHOLECYSTECTOMY, LAPAROSCOPIC      post op infection    ERCP N/A 2022    ERCP BILIARY BRUSHING performed by Roslyn Goldberg, MD at [facility]    ERCP N/A 2022    ERCP STENT INSERTION performed by Roslyn Goldberg, MD at [facility], TOTAL ABDOMINAL ([address])      fibroids still has ovaries    TONSILLECTOMY      as a child    UPPER GASTROINTESTINAL ENDOSCOPY N/A 2022    EGD W/EUS FNA performed by Roslyn Goldberg, MD at [facility] History   Problem Relation Age of Onset    Atrial Fibrillation Mother 80        lived until 99.5    Hypertension Mother     Heart Failure Mother     Pacemaker Father 79         76    Heart Failure Father Atrial Fibrillation Sister     Thyroid Disease Sister     Hypertension Sister     Breast Cancer Sister     Dementia Sister     Other Sister         fell and brain bleed    Atrial Fibrillation Sister     Dementia Maternal Aunt      Social History     Socioeconomic History    Marital status:     Number of children: 2   Occupational History    Occupation: homemaker   Tobacco Use    Smoking status: Never    Smokeless tobacco: Never    Tobacco comments:     father smoked when she was child   Vaping Use    Vaping Use: Never used   Substance and Sexual Activity    Alcohol use: Not Currently     Alcohol/week: 0.0 - 2.0 standard drinks    Drug use: Never     Social Determinants of Health     Financial Resource Strain: Low Risk     Difficulty of Paying Living Expenses: Not hard at all   Food Insecurity: No Food Insecurity    Worried About Running Out of Food in the Last Year: Never true    Ran Out of Food in the Last Year: Never true   Physical Activity: Insufficiently Active    Days of Exercise per Week: 3 days    Minutes of Exercise per Session: 20 min       MEDICATIONS   SCHEDULED:  sodium chloride flush, 5-40 mL, 2 times per day  enoxaparin, 40 mg, Daily  ciprofloxacin, 400 mg, Q12H  metroNIDAZOLE, 500 mg, Q8H      FLUIDS/DRIPS:     sodium chloride      0.9% NaCl with KCl 20 mEq       PRNs: sodium chloride flush, 5-40 mL, PRN  sodium chloride, , PRN  ondansetron, 4 mg, Q8H PRN   Or  ondansetron, 4 mg, Q6H PRN  polyethylene glycol, 17 g, Daily PRN  acetaminophen, 650 mg, Q6H PRN   Or  acetaminophen, 650 mg, Q6H PRN      ALLERGIES:  She   Allergies   Allergen Reactions    Fosamax [Alendronate]      Throat irritation    Iodine Hives    Pcn [Penicillins] Hives    Pseudoephedrine Hives    Cephalosporins Nausea And Vomiting       REVIEW OF SYSTEMS   Pertinent ROS noted in HPI    PHYSICAL EXAM     Vitals:    01/13/23 0426 01/13/23 0428 01/13/23 0812 01/13/23 0812   BP:  (!) 100/57  115/70   Pulse:  84  86   Resp:  16 16 16   Temp:  98.3 °F (36.8 °C)  97.9 °F (36.6 °C)   TempSrc:  Oral  Oral   SpO2:  96% 97% 96%   Weight: 138 lb 10.7 oz (62.9 kg)          No intake/output data recorded. Physical Exam:  General appearance: alert, cooperative, no distress, appears stated age  Eyes: Anicteric  Head: Normocephalic, without obvious abnormality  Lungs: clear to auscultation bilaterally, Normal Effort  Heart: regular rate and rhythm, normal S1 and S2, no murmurs or rubs  Abdomen: soft, non-distended, non-tender. Bowel sounds normal. No masses,  no organomegaly. Extremities: atraumatic, no cyanosis or edema  Skin: warm and dry, no jaundice  Neuro: Grossly intact, A&OX3      LABS AND IMAGING   Laboratory   Recent Labs     01/12/23 2227   WBC 14.2*   HGB 12.0   HCT 35.9*   MCV 88.7        Recent Labs     01/12/23 2227   *   K 3.2*      CO2 20*   BUN 18   CREATININE 0.6     Recent Labs     01/12/23 2227   AST 36  36   ALT 47*  45*   BILIDIR 0.9*   BILITOT 2.0*  2.0*   ALKPHOS 234*  238*     Recent Labs     01/12/23 2227   LIPASE 9.0*     No results for input(s): PROTIME, INR in the last 72 hours. Imaging  CT ABDOMEN PELVIS W IV CONTRAST Additional Contrast? None   Final Result   1. Redemonstration of infiltrative pancreatic head mass with biliary stent   in place. Unchanged appearance of mild biliary enlargement with pneumobilia. 2.  No acute inflammatory process identified. XR CHEST PORTABLE   Final Result   No acute airspace disease identified. ASSESSMENT AND RECOMMENDATIONS   65 y.o. female with a PMH of pancreatic adenocarcinoma with obstructive jaundice, breast cancer, HLD, HTN who presented on 1/12/2023 with acute onset of chills, fever, nausea, vomiting. IMPRESSION:    leukocytosis, fevers NOS. Source unclear. On cipro and flagyl which covers for mild-moderate cholangitis. Bilirubin improving.   CT showed biliary dilation with pneumobilia (expected s/p ERCP and stent placement),  no acute inflammatory process identified. UA neg. CXR neg. Blood cx pending  Pancreatic adenocarcinoma   Obstructive jaundice 2/2 #2. S/p ERCP with stent placement 12/7/22. Jaundice improving. Bilirubin 5.9->2.0  Nausea, vomiting presumed 2/2 recent cancer dx and biliary obstruction with possible superimposed infection. Feeling better today. Tolerating clear liquids. Continue antiemetics as needed      RECOMMENDATIONS:    Continue antibiotics  Monitor CBC, fever curve, liver enzymes. (LFTs not yet done today, will order STAT). Follow up blood cultures  Keep NPO for now   Further input and recommendations by Dr. Brandt Guardian to follow. If you have any questions or need any further information, please feel free to contact our consult team.  Thank you for allowing us to participate in the care of Lauren Cornell. The note was completed using Dragon voice recognition transcription. Every effort was made to ensure accuracy; however, inadvertent transcription errors may be present despite my best efforts to edit errors.       Snow Layne PA-C

## 2023-01-13 NOTE — PLAN OF CARE
Problem: Discharge Planning  Goal: Discharge to home or other facility with appropriate resources  Outcome: Progressing  Flowsheets (Taken 1/13/2023 0249)  Discharge to home or other facility with appropriate resources:   Identify barriers to discharge with patient and caregiver   Arrange for needed discharge resources and transportation as appropriate   Identify discharge learning needs (meds, wound care, etc)     Problem: Safety - Adult  Goal: Free from fall injury  Outcome: Progressing     Problem: Musculoskeletal - Adult  Goal: Return mobility to safest level of function  Outcome: Progressing  Goal: Maintain proper alignment of affected body part  Outcome: Progressing  Goal: Return ADL status to a safe level of function  Outcome: Progressing     Problem: Gastrointestinal - Adult  Goal: Maintains or returns to baseline bowel function  Outcome: Progressing  Goal: Maintains adequate nutritional intake  Outcome: Progressing  Goal: Establish and maintain optimal ostomy function  Outcome: Progressing     Problem: Genitourinary - Adult  Goal: Absence of urinary retention  Outcome: Progressing  Goal: Urinary catheter remains patent  Outcome: Progressing

## 2023-01-14 LAB
ALBUMIN SERPL-MCNC: 2.7 G/DL (ref 3.4–5)
ALP BLD-CCNC: 194 U/L (ref 40–129)
ALT SERPL-CCNC: 40 U/L (ref 10–40)
ANION GAP SERPL CALCULATED.3IONS-SCNC: 10 MMOL/L (ref 3–16)
AST SERPL-CCNC: 37 U/L (ref 15–37)
BASOPHILS ABSOLUTE: 0.1 K/UL (ref 0–0.2)
BASOPHILS RELATIVE PERCENT: 0.5 %
BILIRUB SERPL-MCNC: 1 MG/DL (ref 0–1)
BILIRUBIN DIRECT: 0.7 MG/DL (ref 0–0.3)
BILIRUBIN, INDIRECT: 0.3 MG/DL (ref 0–1)
BUN BLDV-MCNC: 10 MG/DL (ref 7–20)
CALCIUM SERPL-MCNC: 8 MG/DL (ref 8.3–10.6)
CHLORIDE BLD-SCNC: 111 MMOL/L (ref 99–110)
CO2: 19 MMOL/L (ref 21–32)
CREAT SERPL-MCNC: 0.6 MG/DL (ref 0.6–1.2)
EOSINOPHILS ABSOLUTE: 0.1 K/UL (ref 0–0.6)
EOSINOPHILS RELATIVE PERCENT: 1 %
GFR SERPL CREATININE-BSD FRML MDRD: >60 ML/MIN/{1.73_M2}
GLUCOSE BLD-MCNC: 124 MG/DL (ref 70–99)
HCT VFR BLD CALC: 33.7 % (ref 36–48)
HEMOGLOBIN: 10.7 G/DL (ref 12–16)
LYMPHOCYTES ABSOLUTE: 0.9 K/UL (ref 1–5.1)
LYMPHOCYTES RELATIVE PERCENT: 6.8 %
MCH RBC QN AUTO: 29.2 PG (ref 26–34)
MCHC RBC AUTO-ENTMCNC: 31.8 G/DL (ref 31–36)
MCV RBC AUTO: 92.1 FL (ref 80–100)
MONOCYTES ABSOLUTE: 1.5 K/UL (ref 0–1.3)
MONOCYTES RELATIVE PERCENT: 12.1 %
NEUTROPHILS ABSOLUTE: 10.1 K/UL (ref 1.7–7.7)
NEUTROPHILS RELATIVE PERCENT: 79.6 %
PDW BLD-RTO: 15.7 % (ref 12.4–15.4)
PLATELET # BLD: 130 K/UL (ref 135–450)
PMV BLD AUTO: 10.3 FL (ref 5–10.5)
POTASSIUM REFLEX MAGNESIUM: 4.2 MMOL/L (ref 3.5–5.1)
RBC # BLD: 3.65 M/UL (ref 4–5.2)
SODIUM BLD-SCNC: 140 MMOL/L (ref 136–145)
TOTAL PROTEIN: 4.7 G/DL (ref 6.4–8.2)
WBC # BLD: 12.7 K/UL (ref 4–11)

## 2023-01-14 PROCEDURE — 85025 COMPLETE CBC W/AUTO DIFF WBC: CPT

## 2023-01-14 PROCEDURE — 6360000002 HC RX W HCPCS: Performed by: STUDENT IN AN ORGANIZED HEALTH CARE EDUCATION/TRAINING PROGRAM

## 2023-01-14 PROCEDURE — 6360000002 HC RX W HCPCS: Performed by: INTERNAL MEDICINE

## 2023-01-14 PROCEDURE — 1200000000 HC SEMI PRIVATE

## 2023-01-14 PROCEDURE — 2580000003 HC RX 258: Performed by: STUDENT IN AN ORGANIZED HEALTH CARE EDUCATION/TRAINING PROGRAM

## 2023-01-14 PROCEDURE — 80076 HEPATIC FUNCTION PANEL: CPT

## 2023-01-14 PROCEDURE — 2500000003 HC RX 250 WO HCPCS: Performed by: STUDENT IN AN ORGANIZED HEALTH CARE EDUCATION/TRAINING PROGRAM

## 2023-01-14 PROCEDURE — 2580000003 HC RX 258: Performed by: INTERNAL MEDICINE

## 2023-01-14 PROCEDURE — 36415 COLL VENOUS BLD VENIPUNCTURE: CPT

## 2023-01-14 PROCEDURE — 80048 BASIC METABOLIC PNL TOTAL CA: CPT

## 2023-01-14 RX ORDER — SODIUM CHLORIDE 450 MG/100ML
INJECTION, SOLUTION INTRAVENOUS CONTINUOUS
Status: DISCONTINUED | OUTPATIENT
Start: 2023-01-14 | End: 2023-01-15

## 2023-01-14 RX ADMIN — CIPROFLOXACIN 400 MG: 2 INJECTION, SOLUTION INTRAVENOUS at 14:40

## 2023-01-14 RX ADMIN — ENOXAPARIN SODIUM 40 MG: 100 INJECTION SUBCUTANEOUS at 10:47

## 2023-01-14 RX ADMIN — METRONIDAZOLE 500 MG: 500 INJECTION, SOLUTION INTRAVENOUS at 10:46

## 2023-01-14 RX ADMIN — METRONIDAZOLE 500 MG: 500 INJECTION, SOLUTION INTRAVENOUS at 18:37

## 2023-01-14 RX ADMIN — SODIUM CHLORIDE, PRESERVATIVE FREE 10 ML: 5 INJECTION INTRAVENOUS at 10:46

## 2023-01-14 RX ADMIN — POTASSIUM CHLORIDE AND SODIUM CHLORIDE: 900; 150 INJECTION, SOLUTION INTRAVENOUS at 05:40

## 2023-01-14 RX ADMIN — METRONIDAZOLE 500 MG: 500 INJECTION, SOLUTION INTRAVENOUS at 02:15

## 2023-01-14 RX ADMIN — CIPROFLOXACIN 400 MG: 2 INJECTION, SOLUTION INTRAVENOUS at 03:20

## 2023-01-14 RX ADMIN — SODIUM CHLORIDE: 4.5 INJECTION, SOLUTION INTRAVENOUS at 13:10

## 2023-01-14 NOTE — PROGRESS NOTES
Gastroenterology Progress Note    Citlaly De Guzman is a 65 y.o. female patient. Principal Problem:    Intractable nausea and vomiting  Active Problems:    Fever and chills    Hypertension    Malignant neoplasm of head of pancreas (HCC)  Resolved Problems:    * No resolved hospital problems. *      SUBJECTIVE:  No abdominal pain, nausea, vomiting, fevers. Tolerating diet. Current Facility-Administered Medications: sodium chloride flush 0.9 % injection 5-40 mL, 5-40 mL, IntraVENous, 2 times per day  sodium chloride flush 0.9 % injection 5-40 mL, 5-40 mL, IntraVENous, PRN  0.9 % sodium chloride infusion, , IntraVENous, PRN  enoxaparin (LOVENOX) injection 40 mg, 40 mg, SubCUTAneous, Daily  ondansetron (ZOFRAN-ODT) disintegrating tablet 4 mg, 4 mg, Oral, Q8H PRN **OR** ondansetron (ZOFRAN) injection 4 mg, 4 mg, IntraVENous, Q6H PRN  polyethylene glycol (GLYCOLAX) packet 17 g, 17 g, Oral, Daily PRN  acetaminophen (TYLENOL) tablet 650 mg, 650 mg, Oral, Q6H PRN **OR** acetaminophen (TYLENOL) suppository 650 mg, 650 mg, Rectal, Q6H PRN  ciprofloxacin (CIPRO) IVPB 400 mg, 400 mg, IntraVENous, Q12H  metronidazole (FLAGYL) 500 mg in 0.9% NaCl 100 mL IVPB premix, 500 mg, IntraVENous, Q8H  0.9% NaCl with KCl 20 mEq infusion, , IntraVENous, Continuous    Physical    VITALS:  /70   Pulse 83   Temp 98.1 °F (36.7 °C) (Oral)   Resp 18   Ht 5' 4\" (1.626 m)   Wt 139 lb 15.9 oz (63.5 kg)   LMP  (LMP Unknown)   SpO2 95%   BMI 24.03 kg/m²   TEMPERATURE:  Current - Temp: 98.1 °F (36.7 °C);  Max - Temp  Av.6 °F (37 °C)  Min: 98.1 °F (36.7 °C)  Max: 98.9 °F (37.2 °C)    NAD  Eyes: No icterus  RRR  Lungs CTA Bilaterally, normal effort  Abdomen soft, ND, NT, Bowel sounds normal.  Ext: no edema  Neuro: No tremor  Psych: A&Ox3    Data    Data Review:    Recent Labs     23  0645   WBC 14.2* 12.7*   HGB 12.0 10.7*   HCT 35.9* 33.7*   MCV 88.7 92.1    130*     Recent Labs     23 01/13/23  1045 01/14/23  0645   * 141 140   K 3.2* 4.2 4.2    106 111*   CO2 20* 19* 19*   BUN 18 15 10   CREATININE 0.6 0.7 0.6     Recent Labs     01/12/23  2227 01/13/23  1045 01/14/23  0645   AST 36  36 34 37   ALT 47*  45* 41* 40   BILIDIR 0.9* 0.8* 0.7*   BILITOT 2.0*  2.0* 1.3* 1.0   ALKPHOS 234*  238* 198* 194*     Recent Labs     01/12/23 2227   LIPASE 9.0*     No results for input(s): PROTIME, INR in the last 72 hours. No results for input(s): PTT in the last 72 hours. ASSESSMENT:  65 y.o. female with a PMH of pancreatic adenocarcinoma with obstructive jaundice s/p placement of a fully covered metal biliary stent 12/7/2022 with plans for surgical evaluation on Wednesday with Dr. Tiki Atwood at [facility], breast cancer, HLD, HTN who presented on 1/12/2023 with acute onset of chills, fever, nausea, vomiting starting evening prior to admission. Fever was 103. Jaundice and pruritis have resolved and no longer is having carlos-colored stools. I reviewed her CT images and the biliary stent looks wide open with expected pneumobilia above this. No inflammatory process. UA negative. CXR negative. Feeling better with antibiotics. Labs 1/12/23 showed Na 134, K 3.2, bun 18 and Cr 0.6. LFT's showed alk phos 238, AST 36, ALT 47, bili 2, lipase 9 and on 1/13, alk phos down to 198, AST 34, ALT 41, bili 1.3. CBC 14.2, 12/35.9, 151. Klebsiella bacteremia. May have been cholangitis given rise in bili given lack of alternate source. Never had abdominal pain which is unusual.  Now improving clinically and LFT's improving. Stent was just placed 12/7. Sometimes food can get stuck temporarily in the stent and then pass. Currently on CT the stent looks open. Plan: Continue antibiotics   Await sensitivity results. I did get a 2nd opinion from another ERCP physician and we agree since the stent has only been in for a month, no need to change this out at this point as long as she does well clinically. Patient agrees. I notified office to set up repeat LFT's in 1 week. OK for discharge from my standpoint on po cipro. Would give at least 7 days of antibiotics (will defer to medicine length of abx). Thank you for allowing me to participate in the care of your patient. Please feel free to contact me with any concerns.   [address] [facility], MD

## 2023-01-14 NOTE — PLAN OF CARE
Problem: Discharge Planning  Goal: Discharge to home or other facility with appropriate resources  1/14/2023 1124 by Aiyana Garcia RN  Outcome: Progressing     Problem: Safety - Adult  Goal: Free from fall injury  1/14/2023 1124 by Aiyana Garcia RN  Outcome: Progressing     Problem: Musculoskeletal - Adult  Goal: Return mobility to safest level of function  1/14/2023 1124 by Aiyana Garcia RN  Outcome: Progressing     Problem: Musculoskeletal - Adult  Goal: Maintain proper alignment of affected body part  1/14/2023 1124 by Aiyana Garcia RN  Outcome: Progressing     Problem: Gastrointestinal - Adult  Goal: Maintains or returns to baseline bowel function  1/14/2023 1124 by Aiyana Garcia RN  Outcome: Progressing     Problem: Gastrointestinal - Adult  Goal: Maintains adequate nutritional intake  1/14/2023 1124 by Aiyana Garcia RN  Outcome: Progressing     Problem: Gastrointestinal - Adult  Goal: Establish and maintain optimal ostomy function  1/14/2023 1124 by Aiyana Garcia RN  Outcome: Progressing     Problem: Genitourinary - Adult  Goal: Absence of urinary retention  1/14/2023 1124 by Aiyana Garcia RN  Outcome: Progressing     Problem: Genitourinary - Adult  Goal: Urinary catheter remains patent  1/14/2023 1124 by Aiyana Garcia RN  Outcome: Progressing     Problem: Nutrition Deficit:  Goal: Optimize nutritional status  1/14/2023 1124 by Aiyana Garcia RN  Outcome: Progressing

## 2023-01-14 NOTE — PLAN OF CARE
Problem: Discharge Planning  Goal: Discharge to home or other facility with appropriate resources  1/14/2023 0008 by Walter Taylor RN  Outcome: Progressing  1/13/2023 1347 by Riley Resendiz RN  Outcome: Progressing     Problem: Safety - Adult  Goal: Free from fall injury  1/14/2023 0008 by Walter Taylor RN  Outcome: Progressing  1/13/2023 1347 by Riley Resendiz RN  Outcome: Progressing     Problem: Musculoskeletal - Adult  Goal: Return mobility to safest level of function  1/14/2023 0008 by Walter Taylor RN  Outcome: Progressing  1/13/2023 1347 by Riley Resendiz RN  Outcome: Progressing  Goal: Maintain proper alignment of affected body part  1/14/2023 0008 by Walter Taylor RN  Outcome: Progressing  1/13/2023 Daryl7 by Riley Resendiz RN  Outcome: Progressing  Goal: Return ADL status to a safe level of function  1/14/2023 0008 by Walter Taylor RN  Outcome: Progressing  1/13/2023 1347 by Riley Resendiz RN  Outcome: Progressing     Problem: Gastrointestinal - Adult  Goal: Maintains or returns to baseline bowel function  1/14/2023 0008 by Walter Taylor RN  Outcome: Progressing  1/13/2023 1347 by Riley Resendiz RN  Outcome: Progressing  Goal: Maintains adequate nutritional intake  1/14/2023 0008 by aWlter Taylor RN  Outcome: Progressing  1/13/2023 1347 by Riley Resendiz RN  Outcome: Progressing  Goal: Establish and maintain optimal ostomy function  1/14/2023 0008 by Walter Taylor RN  Outcome: Progressing  1/13/2023 1347 by Riley Resendiz RN  Outcome: Progressing     Problem: Genitourinary - Adult  Goal: Absence of urinary retention  1/14/2023 0008 by Walter Taylor RN  Outcome: Progressing  1/13/2023 1347 by Riley Resendiz RN  Outcome: Progressing  Goal: Urinary catheter remains patent  1/14/2023 0008 by Walter Taylor RN  Outcome: Progressing  1/13/2023 1347 by Riley Resendiz RN  Outcome: Progressing     Problem: Nutrition Deficit:  Goal: Optimize nutritional status  1/14/2023 0008 by Walter Taylor RN  Outcome: Progressing  1/13/2023 1347 by Abraham Rivera RN  Outcome: Progressing

## 2023-01-14 NOTE — PROGRESS NOTES
Patient in room with family members at bedside. Denies pain patient. Patient call light in lap and warmed blanket provided.

## 2023-01-14 NOTE — PROGRESS NOTES
[facility] Internal Medicine Note      Chief Complaint: I am much better    Subjective/Interval History: This morning the patient is sitting up in bed, her  is at the bedside. She reports she is feeling much better. Her nausea and vomiting has resolved. She is able to eat and is tolerating well. She feels like she is ready to go home. She denies pain. Excited that her jaundice has resolved. No other new problems noted. Reviewed the blood cultures with the patient, blood cultures x2 growing Klebsiella pneumoniae, sensitivities pending. PMH, PSH, FH/SH reviewed and unchanged as documented in the H&P dated 23    Medication list reviewed    Objective:    /70   Pulse 83   Temp 98.1 °F (36.7 °C) (Oral)   Resp 18   Ht 5' 4\" (1.626 m)   Wt 139 lb 15.9 oz (63.5 kg)   LMP  (LMP Unknown)   SpO2 95%   BMI 24.03 kg/m²   Temp  Av.6 °F (37 °C)  Min: 98.1 °F (36.7 °C)  Max: 98.9 °F (37.2 °C)    RRR  Chest-chest is clear throughout. No wheezes or rhonchi or crackles noted. Abd-active bowel sounds present, abdomen soft and nondistended. Nontender.   Ext-no edema    The Following Labs Were Reviewed Today:      Recent Results (from the past 24 hour(s))   Basic Metabolic Panel w/ Reflex to MG    Collection Time: 23  6:45 AM   Result Value Ref Range    Sodium 140 136 - 145 mmol/L    Potassium reflex Magnesium 4.2 3.5 - 5.1 mmol/L    Chloride 111 (H) 99 - 110 mmol/L    CO2 19 (L) 21 - 32 mmol/L    Anion Gap 10 3 - 16    Glucose 124 (H) 70 - 99 mg/dL    BUN 10 7 - 20 mg/dL    Creatinine 0.6 0.6 - 1.2 mg/dL    Est, Glom Filt Rate >60 >60    Calcium 8.0 (L) 8.3 - 10.6 mg/dL   Hepatic function panel    Collection Time: 23  6:45 AM   Result Value Ref Range    Total Protein 4.7 (L) 6.4 - 8.2 g/dL    Albumin 2.7 (L) 3.4 - 5.0 g/dL    Alkaline Phosphatase 194 (H) 40 - 129 U/L    ALT 40 10 - 40 U/L    AST 37 15 - 37 U/L    Total Bilirubin 1.0 0.0 - 1.0 mg/dL    Bilirubin, Direct 0.7 (H) 0.0 - 0.3 mg/dL    Bilirubin, Indirect 0.3 0.0 - 1.0 mg/dL   CBC with Auto Differential    Collection Time: 01/14/23  6:45 AM   Result Value Ref Range    WBC 12.7 (H) 4.0 - 11.0 K/uL    RBC 3.65 (L) 4.00 - 5.20 M/uL    Hemoglobin 10.7 (L) 12.0 - 16.0 g/dL    Hematocrit 33.7 (L) 36.0 - 48.0 %    MCV 92.1 80.0 - 100.0 fL    MCH 29.2 26.0 - 34.0 pg    MCHC 31.8 31.0 - 36.0 g/dL    RDW 15.7 (H) 12.4 - 15.4 %    Platelets 635 (L) 747 - 450 K/uL    MPV 10.3 5.0 - 10.5 fL    Neutrophils % 79.6 %    Lymphocytes % 6.8 %    Monocytes % 12.1 %    Eosinophils % 1.0 %    Basophils % 0.5 %    Neutrophils Absolute 10.1 (H) 1.7 - 7.7 K/uL    Lymphocytes Absolute 0.9 (L) 1.0 - 5.1 K/uL    Monocytes Absolute 1.5 (H) 0.0 - 1.3 K/uL    Eosinophils Absolute 0.1 0.0 - 0.6 K/uL    Basophils Absolute 0.1 0.0 - 0.2 K/uL       ASSESSMENT/PLAN:        Klebsiella bacteremia -clinically improving on Cipro and Flagyl. Await Klebsiella sensitivities and then adjust antibiotics. Discontinue Flagyl since Klebsiella has been identified. Intractable nausea and vomiting-improved. Slow IV fluids down to 50 mL/h and continue to monitor today while we await sensitivities. Fever and chills-resolved. Hypertension-blood pressure is well controlled. Continue to monitor. Malignant neoplasm of head of pancreas- will have outpatient follow-up soon.     Mitra Cid MD, Lenard Bauman  11:37 AM  1/14/2023

## 2023-01-15 PROBLEM — R50.9 FEVER AND CHILLS: Status: RESOLVED | Noted: 2023-01-13 | Resolved: 2023-01-15

## 2023-01-15 PROBLEM — A41.4 SEPSIS DUE TO KLEBSIELLA PNEUMONIAE (HCC): Status: ACTIVE | Noted: 2023-01-15

## 2023-01-15 LAB
ALBUMIN SERPL-MCNC: 2.7 G/DL (ref 3.4–5)
ALP BLD-CCNC: 182 U/L (ref 40–129)
ALT SERPL-CCNC: 32 U/L (ref 10–40)
ANION GAP SERPL CALCULATED.3IONS-SCNC: 9 MMOL/L (ref 3–16)
AST SERPL-CCNC: 23 U/L (ref 15–37)
BASOPHILS ABSOLUTE: 0 K/UL (ref 0–0.2)
BASOPHILS RELATIVE PERCENT: 0.4 %
BILIRUB SERPL-MCNC: 0.9 MG/DL (ref 0–1)
BILIRUBIN DIRECT: 0.6 MG/DL (ref 0–0.3)
BILIRUBIN URINE: NEGATIVE
BILIRUBIN, INDIRECT: 0.3 MG/DL (ref 0–1)
BLOOD CULTURE, ROUTINE: ABNORMAL
BLOOD, URINE: NEGATIVE
BUN BLDV-MCNC: 8 MG/DL (ref 7–20)
CALCIUM SERPL-MCNC: 8.2 MG/DL (ref 8.3–10.6)
CHLORIDE BLD-SCNC: 110 MMOL/L (ref 99–110)
CLARITY: CLEAR
CO2: 23 MMOL/L (ref 21–32)
COLOR: YELLOW
CREAT SERPL-MCNC: 0.6 MG/DL (ref 0.6–1.2)
CULTURE, BLOOD 2: ABNORMAL
CULTURE, BLOOD 2: ABNORMAL
EOSINOPHILS ABSOLUTE: 0.2 K/UL (ref 0–0.6)
EOSINOPHILS RELATIVE PERCENT: 2 %
GFR SERPL CREATININE-BSD FRML MDRD: >60 ML/MIN/{1.73_M2}
GLUCOSE BLD-MCNC: 108 MG/DL (ref 70–99)
GLUCOSE URINE: NEGATIVE MG/DL
HCT VFR BLD CALC: 33.6 % (ref 36–48)
HEMOGLOBIN: 11.1 G/DL (ref 12–16)
KETONES, URINE: NEGATIVE MG/DL
LEUKOCYTE ESTERASE, URINE: NEGATIVE
LYMPHOCYTES ABSOLUTE: 1.5 K/UL (ref 1–5.1)
LYMPHOCYTES RELATIVE PERCENT: 14 %
MCH RBC QN AUTO: 29.8 PG (ref 26–34)
MCHC RBC AUTO-ENTMCNC: 33 G/DL (ref 31–36)
MCV RBC AUTO: 90.4 FL (ref 80–100)
MICROSCOPIC EXAMINATION: NORMAL
MONOCYTES ABSOLUTE: 1.3 K/UL (ref 0–1.3)
MONOCYTES RELATIVE PERCENT: 11.8 %
NEUTROPHILS ABSOLUTE: 7.7 K/UL (ref 1.7–7.7)
NEUTROPHILS RELATIVE PERCENT: 71.8 %
NITRITE, URINE: NEGATIVE
ORGANISM: ABNORMAL
PDW BLD-RTO: 15.4 % (ref 12.4–15.4)
PH UA: 5.5 (ref 5–8)
PLATELET # BLD: 152 K/UL (ref 135–450)
PMV BLD AUTO: 10.1 FL (ref 5–10.5)
POTASSIUM SERPL-SCNC: 4 MMOL/L (ref 3.5–5.1)
PROTEIN UA: NEGATIVE MG/DL
RBC # BLD: 3.71 M/UL (ref 4–5.2)
SODIUM BLD-SCNC: 142 MMOL/L (ref 136–145)
SPECIFIC GRAVITY UA: 1.01 (ref 1–1.03)
TOTAL PROTEIN: 4.8 G/DL (ref 6.4–8.2)
URINE REFLEX TO CULTURE: NORMAL
URINE TYPE: NORMAL
UROBILINOGEN, URINE: 0.2 E.U./DL
WBC # BLD: 10.8 K/UL (ref 4–11)

## 2023-01-15 PROCEDURE — 2580000003 HC RX 258: Performed by: STUDENT IN AN ORGANIZED HEALTH CARE EDUCATION/TRAINING PROGRAM

## 2023-01-15 PROCEDURE — 1200000000 HC SEMI PRIVATE

## 2023-01-15 PROCEDURE — 94760 N-INVAS EAR/PLS OXIMETRY 1: CPT

## 2023-01-15 PROCEDURE — 2580000003 HC RX 258: Performed by: INTERNAL MEDICINE

## 2023-01-15 PROCEDURE — 85025 COMPLETE CBC W/AUTO DIFF WBC: CPT

## 2023-01-15 PROCEDURE — 36415 COLL VENOUS BLD VENIPUNCTURE: CPT

## 2023-01-15 PROCEDURE — 80048 BASIC METABOLIC PNL TOTAL CA: CPT

## 2023-01-15 PROCEDURE — 6360000002 HC RX W HCPCS: Performed by: STUDENT IN AN ORGANIZED HEALTH CARE EDUCATION/TRAINING PROGRAM

## 2023-01-15 PROCEDURE — 81003 URINALYSIS AUTO W/O SCOPE: CPT

## 2023-01-15 PROCEDURE — 80076 HEPATIC FUNCTION PANEL: CPT

## 2023-01-15 RX ADMIN — CIPROFLOXACIN 400 MG: 2 INJECTION, SOLUTION INTRAVENOUS at 02:25

## 2023-01-15 RX ADMIN — SODIUM CHLORIDE, PRESERVATIVE FREE 10 ML: 5 INJECTION INTRAVENOUS at 21:47

## 2023-01-15 RX ADMIN — CIPROFLOXACIN 400 MG: 2 INJECTION, SOLUTION INTRAVENOUS at 14:41

## 2023-01-15 RX ADMIN — ENOXAPARIN SODIUM 40 MG: 100 INJECTION SUBCUTANEOUS at 10:13

## 2023-01-15 RX ADMIN — SODIUM CHLORIDE: 4.5 INJECTION, SOLUTION INTRAVENOUS at 10:17

## 2023-01-15 NOTE — PROGRESS NOTES
Department of Internal Medicine  General Internal Medicine  Attending Progress Note    Chief Complaint:feels better      HPI:   SUBJECTIVE:  66 yo female with newly diagnosed cancer of the head of the pancreas who is admitted with fever and rigors. BC's grew 2/2 bottles pos Klebs pn. She is on cipro. Has not seen ID yet. Wants to go home has appt to see surgeon to discuss resection of the cancer this Wed!     Past Medical History:   Diagnosis Date    Adenomatous colon polyp     Elise Bonds every 5 years    adolescent scoliosis     mostly lumbar    Basal cell carcinoma of skin     sees derm    Breast CA ([address] 2015    34 treatments of radiation     Breast cancer ([address]     right: stage 0, lumpectomy, xrt, h/onc Dr Myriam [facility]    DCIS    Chronic throat pain     resolved with therapy    Elevated BP without diagnosis of hypertension     white coat, runs 120-130/60-77 at home    High cholesterol     cv risk 21%    Osteopenia after menopause 2018    on calcium and vit D    Prolonged emergence from general anesthesia      Past Surgical History:   Procedure Laterality Date    APPENDECTOMY      at the same time as hys    BREAST LUMPECTOMY Right 2010    surgeon Dr. Mckeon Serum      post op infection    ERCP N/A 2022    ERCP BILIARY BRUSHING performed by Ashley Nicholson MD at [address] [facility]    ERCP N/A 2022    ERCP STENT INSERTION performed by Ashley Nicholson MD at [address] [place] [address], TOTAL ABDOMINAL (CERVIX REMOVED)      fibroids still has ovaries    TONSILLECTOMY      as a child    UPPER GASTROINTESTINAL ENDOSCOPY N/A 2022    EGD W/EUS FNA performed by Ashley Nicholson MD at  [address] History   Problem Relation Age of Onset    Atrial Fibrillation Mother 80        lived until 99.5    Hypertension Mother     Heart Failure Mother     Pacemaker Father 79         76    Heart Failure Father     Atrial Fibrillation Sister     Thyroid Disease Sister     Hypertension Sister     Breast Cancer Sister     Dementia Sister     Other Sister         fell and brain bleed    Atrial Fibrillation Sister     Dementia Maternal Aunt      Social History     Tobacco Use    Smoking status: Never    Smokeless tobacco: Never    Tobacco comments:     father smoked when she was child   Vaping Use    Vaping Use: Never used   Substance Use Topics    Alcohol use: Not Currently     Alcohol/week: 0.0 - 2.0 standard drinks    Drug use: Never       Prior to Admission medications    Medication Sig Start Date End Date Taking? Authorizing Provider   ondansetron (ZOFRAN) 8 MG tablet Take 8 mg by mouth every 8 hours as needed for Nausea or Vomiting   Yes Historical Provider, MD   lactulose (CHRONULAC) 10 GM/15ML solution Take 10 g by mouth daily   Yes Historical Provider, MD   cetirizine (ZYRTEC) 10 MG tablet Take 10 mg by mouth daily   Yes Historical Provider, MD         ROS:  A comprehensive review of systems was negative except for: abdo discomfort nausea    OBJECTIVE:      PHYSICAL:  Intake/Output:   Patient Vitals for the past 8 hrs:   BP Temp Temp src Pulse Resp SpO2   01/15/23 0846 (!) 154/84 98.4 °F (36.9 °C) Oral 82 18 94 %   01/15/23 0724 -- -- -- 75 16 95 %   01/15/23 0418 126/72 98 °F (36.7 °C) Oral 76 16 94 %     I/O last 3 completed shifts: In: 4784 [P.O.:120; I.V.:820.8; IV Piggyback:381.1]  Out: -   No intake/output data recorded.   VITALS:    BP (!) 154/84   Pulse 82   Temp 98.4 °F (36.9 °C) (Oral)   Resp 18   Ht 5' 4\" (1.626 m)   Wt 139 lb 15.9 oz (63.5 kg)   LMP  (LMP Unknown)   SpO2 94%   BMI 24.03 kg/m²     General Appearance: alert and oriented to person, place and time, well-developed and well-nourished, in no acute distress  Skin: warm and dry, no rash or erythema  Head: normocephalic and atraumatic  Eyes: conjunctivae normal and sclera anicteric  ENT: hearing grossly normal bilaterally and sinuses non-tender  Neck: neck supple and non tender without mass, no thyromegaly or thyroid nodules, no cervical lymphadenopathy   Pulmonary/Chest: clear to auscultation bilaterally- no wheezes, rales or rhonchi, normal air movement, no respiratory distress  Cardiovascular: normal rate, normal S1 and S2, no gallops and no carotid bruits  Abdomen: soft, non-tender, non-distended, normal bowel sounds, no masses or organomegaly tender LUQ  Extremities: no cyanosis, clubbing or edema  Musculoskeletal: no swollen joints and no tender joints,  Neurologic: coordination normal and speech normal, moves all 4 extremities    DATA:   Labs:  CBC:   Recent Labs     01/12/23  2227 01/14/23  0645 01/15/23  0718   WBC 14.2* 12.7* 10.8   HGB 12.0 10.7* 11.1*    130* 152     BMP:    Recent Labs     01/13/23  1045 01/14/23  0645 01/15/23  0718    140 142   K 4.2 4.2 4.0    111* 110   CO2 19* 19* 23   BUN 15 10 8   CREATININE 0.7 0.6 0.6   GLUCOSE 141* 124* 108*     POC GLUCOSE:  No results for input(s): POCGLU in the last 72 hours. Ca/Mg/Phos:   Recent Labs     01/13/23  1045 01/14/23  0645 01/15/23  0718   CALCIUM 9.0 8.0* 8.2*   MG 2.50*  --   --      Hepatic:   Recent Labs     01/13/23  1045 01/14/23  0645 01/15/23  0718   AST 34 37 23   ALT 41* 40 32   BILITOT 1.3* 1.0 0.9   ALKPHOS 198* 194* 182*       DIAGNOSTICS:  CT ABDOMEN PELVIS W IV CONTRAST Additional Contrast? None    Result Date: 1/13/2023  1. Redemonstration of infiltrative pancreatic head mass with biliary stent in place. Unchanged appearance of mild biliary enlargement with pneumobilia. 2.  No acute inflammatory process identified. XR CHEST PORTABLE    Result Date: 1/12/2023  No acute airspace disease identified.          ASSESSMENT AND PLAN    Principal Problem:    Intractable nausea and vomiting  Plan: resolved  Active Problems:    Malignant neoplasm of head of pancreas ([address]  Plan: potential resection to be done soon    Sepsis due to Klebsiella pneumoniae [facility])  Plan: awaiting ID input possibly home today question is how long to stay on the cipro    Hypertension  Plan: a little higher today, stop ivfs        Appropriate diagnostic studies and consults ordered  Mayda Ferraro MD

## 2023-01-15 NOTE — PLAN OF CARE
Problem: Discharge Planning  Goal: Discharge to home or other facility with appropriate resources  1/14/2023 2317 by Nette Wilson RN  Outcome: Jim Huffmanchregine (Taken 1/14/2023 2030)  Discharge to home or other facility with appropriate resources: Identify barriers to discharge with patient and caregiver     Problem: Safety - Adult  Goal: Free from fall injury  1/14/2023 2317 by Nette Wilson RN  Outcome: Progressing     Problem: Musculoskeletal - Adult  Goal: Return mobility to safest level of function  1/14/2023 2317 by Nette Wilson RN  Outcome: Progressing     Problem: Musculoskeletal - Adult  Goal: Maintain proper alignment of affected body part  1/14/2023 2317 by Nette Wilson RN  Outcome: Progressing     Problem: Musculoskeletal - Adult  Goal: Return ADL status to a safe level of function  1/14/2023 2317 by Nette Wilson RN  Outcome: Progressing     Problem: Gastrointestinal - Adult  Goal: Maintains or returns to baseline bowel function  1/14/2023 2317 by Nette Wilson RN  Outcome: Progressing     Problem: Gastrointestinal - Adult  Goal: Maintains adequate nutritional intake  1/14/2023 2317 by Nette Wilson RN  Outcome: Progressing     Problem: Gastrointestinal - Adult  Goal: Establish and maintain optimal ostomy function  1/14/2023 2317 by Nette Wilson RN  Outcome: Progressing     Problem: Genitourinary - Adult  Goal: Absence of urinary retention  1/14/2023 2317 by Nette Wilson RN  Outcome: Progressing     Problem: Genitourinary - Adult  Goal: Urinary catheter remains patent  1/14/2023 2317 by Nette Wilson RN  Outcome: Progressing     Problem: Nutrition Deficit:  Goal: Optimize nutritional status  1/14/2023 2317 by Nette Wilson RN  Outcome: Progressing

## 2023-01-15 NOTE — PROGRESS NOTES
Physician Progress Note      PATIENT:               BETO YOUNG  CSN #:                  [id number]  :                       1945  ADMIT DATE:       2023 10:12 PM  DISCH DATE:  RESPONDING  PROVIDER #:        Sofía Vee MD          QUERY TEXT:    Pt admitted with Nausea and Klebsiella Bacteremia . Pt noted to have  elevated   HR, and WBC . If possible, please document in the progress notes and   discharge summary if you are evaluating and /or treating any of the following:    The medical record reflects the following:  Risk Factors: Pancreatic cancer recent biliary stent placed on 22    possible Cholangitis  Clinical Indicators: WBC 14.2, HR > 90, Klebsiella bacteremia x 2 cultures,   per GI Consult \"gram negative bacteremia.  May have been cholangitis given   rise in bili given lack of alternate source.  Never had abdominal pain which   is unusual.  Now improving clinically and LFT's improving.  Stent was just   placed .  Sometimes food can get stuck temporarily in the stent and then   pass\"  Treatment: IV Flagyl and Cipro  Options provided:  -- Sepsis, 2/2 to Klebsiella bacteremia likely associated with biliary stent   and cholangitis  present on admission  -- Sepsis, 2/2 to Klebsiella bacteremia present on admission  -- Other - I will add my own diagnosis  -- Disagree - Not applicable / Not valid  -- Disagree - Clinically unable to determine / Unknown  -- Refer to Clinical Documentation Reviewer    PROVIDER RESPONSE TEXT:    This patient has sepsis, 2/2 Klebsiella bacteremia likely associated with   biliary stent and cholangitis which was present on admission.    Query created by: Patricia Hoang on 2023 3:25 PM      Electronically signed by:  Sofía Vee MD 1/15/2023 10:29 AM

## 2023-01-15 NOTE — PROGRESS NOTES
Gastroenterology Progress Note    Meera Han is a 65 y.o. female patient. Principal Problem:    Intractable nausea and vomiting  Active Problems:    Fever and chills    Hypertension    Malignant neoplasm of head of pancreas (HCC)  Resolved Problems:    * No resolved hospital problems. *      SUBJECTIVE:  No abdominal pain, nausea, vomiting, fevers. Tolerating diet. Current Facility-Administered Medications: 0.45 % sodium chloride infusion, , IntraVENous, Continuous  sodium chloride flush 0.9 % injection 5-40 mL, 5-40 mL, IntraVENous, 2 times per day  sodium chloride flush 0.9 % injection 5-40 mL, 5-40 mL, IntraVENous, PRN  0.9 % sodium chloride infusion, , IntraVENous, PRN  enoxaparin (LOVENOX) injection 40 mg, 40 mg, SubCUTAneous, Daily  ondansetron (ZOFRAN-ODT) disintegrating tablet 4 mg, 4 mg, Oral, Q8H PRN **OR** ondansetron (ZOFRAN) injection 4 mg, 4 mg, IntraVENous, Q6H PRN  polyethylene glycol (GLYCOLAX) packet 17 g, 17 g, Oral, Daily PRN  acetaminophen (TYLENOL) tablet 650 mg, 650 mg, Oral, Q6H PRN **OR** acetaminophen (TYLENOL) suppository 650 mg, 650 mg, Rectal, Q6H PRN  ciprofloxacin (CIPRO) IVPB 400 mg, 400 mg, IntraVENous, Q12H    Physical    VITALS:  BP (!) 154/84   Pulse 82   Temp 98.4 °F (36.9 °C) (Oral)   Resp 18   Ht 5' 4\" (1.626 m)   Wt 139 lb 15.9 oz (63.5 kg)   LMP  (LMP Unknown)   SpO2 94%   BMI 24.03 kg/m²   TEMPERATURE:  Current - Temp: 98.4 °F (36.9 °C);  Max - Temp  Av.3 °F (36.8 °C)  Min: 97.9 °F (36.6 °C)  Max: 98.7 °F (37.1 °C)    NAD  Eyes: No icterus  RRR  Lungs CTA Bilaterally, normal effort  Abdomen soft, ND, NT, Bowel sounds normal.  Ext: no edema  Neuro: No tremor  Psych: A&Ox3    Data    Data Review:    Recent Labs     23  2227 23  0645 01/15/23  0718   WBC 14.2* 12.7* 10.8   HGB 12.0 10.7* 11.1*   HCT 35.9* 33.7* 33.6*   MCV 88.7 92.1 90.4    130* 152     Recent Labs     23  1045 23  0645 01/15/23  0718    140 142   K 4.2 4.2 4.0    111* 110   CO2 19* 19* 23   BUN 15 10 8   CREATININE 0.7 0.6 0.6     Recent Labs     01/13/23  1045 01/14/23  0645 01/15/23  0718   AST 34 37 23   ALT 41* 40 32   BILIDIR 0.8* 0.7* 0.6*   BILITOT 1.3* 1.0 0.9   ALKPHOS 198* 194* 182*     Recent Labs     01/12/23  2227   LIPASE 9.0*     No results for input(s): PROTIME, INR in the last 72 hours. No results for input(s): PTT in the last 72 hours. ASSESSMENT:  65 y.o. female with a PMH of pancreatic adenocarcinoma with obstructive jaundice s/p placement of a fully covered metal biliary stent 12/7/2022 with plans for surgical evaluation on Wednesday with Dr. Daysi Casper at [facility], breast cancer, HLD, HTN who presented on 1/12/2023 with acute onset of chills, fever, nausea, vomiting starting evening prior to admission. Fever was 103. Jaundice and pruritis have resolved and no longer is having carlos-colored stools. I reviewed her CT images and the biliary stent looks wide open with expected pneumobilia above this. No inflammatory process. UA negative. CXR negative. Feeling better with antibiotics. Labs 1/12/23 showed Na 134, K 3.2, bun 18 and Cr 0.6. LFT's showed alk phos 238, AST 36, ALT 47, bili 2, lipase 9 and on 1/13, alk phos down to 198, AST 34, ALT 41, bili 1.3. CBC 14.2, 12/35.9, 151. Klebsiella bacteremia. May have been cholangitis given rise in bili given lack of alternate source. Never had abdominal pain which is unusual.  Now improving clinically and LFT's improving. Stent was just placed 12/7. Sometimes food can get stuck temporarily in the stent and then pass. Currently on CT the stent looks open. Plan: Klebsiella sensitive to cipro, would consider discharge on cipro. I notified office to set up repeat LFT's in 1 week. OK for discharge from my standpoint on po cipro. Would give at least 7 days of antibiotics (will defer to medicine length of abx).   Discussed with patient and she says ID has been consulted so will defer abx selection and length of therapy to ID  Will sign off. Please call with questions. Thank you for allowing me to participate in the care of your patient. Please feel free to contact me with any concerns.   [address] [facility], [place]

## 2023-01-15 NOTE — PLAN OF CARE
Problem: Discharge Planning  Goal: Discharge to home or other facility with appropriate resources  Outcome: Progressing     Problem: Safety - Adult  Goal: Free from fall injury  Outcome: Progressing     Problem: Musculoskeletal - Adult  Goal: Return mobility to safest level of function  Outcome: Progressing     Problem: Musculoskeletal - Adult  Goal: Maintain proper alignment of affected body part  Outcome: Progressing     Problem: Musculoskeletal - Adult  Goal: Return mobility to safest level of function  Outcome: Progressing     Problem: Musculoskeletal - Adult  Goal: Return ADL status to a safe level of function  Outcome: Progressing     Problem: Gastrointestinal - Adult  Goal: Maintains or returns to baseline bowel function  Outcome: Progressing     Problem: Gastrointestinal - Adult  Goal: Maintains adequate nutritional intake  Outcome: Progressing     Problem: Gastrointestinal - Adult  Goal: Establish and maintain optimal ostomy function  Outcome: Progressing     Problem: Genitourinary - Adult  Goal: Absence of urinary retention  Outcome: Progressing     Problem: Genitourinary - Adult  Goal: Urinary catheter remains patent  Outcome: Progressing     Problem: Nutrition Deficit:  Goal: Optimize nutritional status  Outcome: Progressing

## 2023-01-16 VITALS
TEMPERATURE: 98.6 F | SYSTOLIC BLOOD PRESSURE: 143 MMHG | WEIGHT: 139.99 LBS | RESPIRATION RATE: 18 BRPM | HEART RATE: 89 BPM | BODY MASS INDEX: 23.9 KG/M2 | OXYGEN SATURATION: 95 % | HEIGHT: 64 IN | DIASTOLIC BLOOD PRESSURE: 76 MMHG

## 2023-01-16 PROCEDURE — 6360000002 HC RX W HCPCS: Performed by: STUDENT IN AN ORGANIZED HEALTH CARE EDUCATION/TRAINING PROGRAM

## 2023-01-16 RX ORDER — SACCHAROMYCES BOULARDII 250 MG
250 CAPSULE ORAL 2 TIMES DAILY
Qty: 14 CAPSULE | Refills: 0 | Status: SHIPPED | OUTPATIENT
Start: 2023-01-16 | End: 2023-01-23

## 2023-01-16 RX ORDER — CIPROFLOXACIN 500 MG/1
500 TABLET, FILM COATED ORAL 2 TIMES DAILY
Qty: 14 TABLET | Refills: 0 | Status: SHIPPED | OUTPATIENT
Start: 2023-01-16 | End: 2023-01-23

## 2023-01-16 RX ADMIN — CIPROFLOXACIN 400 MG: 2 INJECTION, SOLUTION INTRAVENOUS at 03:49

## 2023-01-16 NOTE — PLAN OF CARE
Problem: Discharge Planning  Goal: Discharge to home or other facility with appropriate resources  Outcome: Progressing  Flowsheets (Taken 1/15/2023 2015)  Discharge to home or other facility with appropriate resources: Identify barriers to discharge with patient and caregiver     Problem: Safety - Adult  Goal: Free from fall injury  Outcome: Progressing     Problem: Musculoskeletal - Adult  Goal: Return mobility to safest level of function  Outcome: Progressing  Goal: Maintain proper alignment of affected body part  Outcome: Progressing  Goal: Return ADL status to a safe level of function  Outcome: Progressing     Problem: Gastrointestinal - Adult  Goal: Maintains or returns to baseline bowel function  Outcome: Progressing  Goal: Maintains adequate nutritional intake  Outcome: Progressing  Goal: Establish and maintain optimal ostomy function  Outcome: Progressing     Problem: Genitourinary - Adult  Goal: Absence of urinary retention  Outcome: Progressing  Goal: Urinary catheter remains patent  Outcome: Progressing     Problem: Nutrition Deficit:  Goal: Optimize nutritional status  Outcome: Progressing

## 2023-01-16 NOTE — PLAN OF CARE
Problem: Discharge Planning  Goal: Discharge to home or other facility with appropriate resources  1/16/2023 1056 by Lula Albert RN  Outcome: Progressing  1/16/2023 0550 by Taj Pereira RN  Outcome: Progressing  Flowsheets (Taken 1/15/2023 2015)  Discharge to home or other facility with appropriate resources: Identify barriers to discharge with patient and caregiver     Problem: Safety - Adult  Goal: Free from fall injury  1/16/2023 1056 by Lula Albert RN  Outcome: Progressing  1/16/2023 0550 by Taj Pereira RN  Outcome: Progressing     Problem: Musculoskeletal - Adult  Goal: Return mobility to safest level of function  1/16/2023 1056 by Lula Albert RN  Outcome: Progressing  1/16/2023 0550 by Taj Pereira RN  Outcome: Progressing  Goal: Maintain proper alignment of affected body part  1/16/2023 1056 by Lula Albert RN  Outcome: Progressing  1/16/2023 0550 by Taj Pereira RN  Outcome: Progressing  Goal: Return ADL status to a safe level of function  1/16/2023 1056 by Lula Albert RN  Outcome: Progressing  1/16/2023 0550 by Taj Pereira RN  Outcome: Progressing     Problem: Gastrointestinal - Adult  Goal: Maintains or returns to baseline bowel function  1/16/2023 1056 by Lula Albert RN  Outcome: Progressing  1/16/2023 0550 by Taj Pereira RN  Outcome: Progressing  Goal: Maintains adequate nutritional intake  1/16/2023 1056 by Lula Albert RN  Outcome: Progressing  1/16/2023 0550 by Taj Pereira RN  Outcome: Progressing  Goal: Establish and maintain optimal ostomy function  1/16/2023 1056 by Lula Albert RN  Outcome: Progressing  1/16/2023 0550 by Taj Pereira RN  Outcome: Progressing     Problem: Genitourinary - Adult  Goal: Absence of urinary retention  1/16/2023 1056 by Lula Albert RN  Outcome: Progressing  1/16/2023 0550 by aTj Pereira RN  Outcome: Progressing  Goal: Urinary catheter remains patent  1/16/2023 1056 by Lauren Samuel RN  Outcome: Progressing  1/16/2023 0550 by Maria Dolores Pereira RN  Outcome: Progressing     Problem: Nutrition Deficit:  Goal: Optimize nutritional status  1/16/2023 1056 by Lauren Samuel RN  Outcome: Progressing  1/16/2023 0550 by Maria Dolores Pereira RN  Outcome: Progressing

## 2023-01-16 NOTE — DISCHARGE INSTR - DIET

## 2023-01-16 NOTE — DISCHARGE INSTR - COC
Continuity of Care Form    Patient Name: Héctor Romano   :  [birth year]  MRN:  [id number]    Admit date:  2023  Discharge date:  22    Code Status Order: Full Code   Advance Directives:     Admitting Physician:  Francisco J Parra MD  PCP: Holli Sorensen MD    Discharging Nurse: [facility] Unit/Room#: E9Y-0765/2276-75  Discharging Unit Phone Number: [phone]    Emergency Contact:   Extended Emergency Contact Information  Primary Emergency Contact: [address] Phone: [phone]  Mobile Phone: [phone]  Relation: Spouse  Secondary Emergency Contact: Jhoan Griffin  Mobile Phone: [phone]  Relation: Child  Preferred language: English   needed?  No    Past Surgical History:  Past Surgical History:   Procedure Laterality Date    APPENDECTOMY      at the same time as hys    BREAST LUMPECTOMY Right 2010    surgeon Dr. Gayle Jaime      post op infection    ERCP N/A 2022    ERCP BILIARY BRUSHING performed by Lowell Woodard MD at [facility]    ERCP N/A 2022    ERCP STENT INSERTION performed by Lowell Woodard MD at [address] [place] [address], TOTAL ABDOMINAL (CERVIX REMOVED)      fibroids still has ovaries    TONSILLECTOMY      as a child    UPPER GASTROINTESTINAL ENDOSCOPY N/A 2022    EGD W/EUS FNA performed by Lowell Woodard MD at [facility] ENDOSCOPY       Immunization History:   Immunization History   Administered Date(s) Administered    COVID-19, MODERNA BLUE border, Primary or Immunocompromised, (age 12y+), IM, 100 mcg/0.5mL 2021, 2021, 2021    COVID-19, MODERNA Bivalent BOOSTER, (age 12y+), IM, 48 mcg/0.5 mL 2022    Influenza 10/01/2005, 2012, 2013, 2014, 2015    Influenza, FLUAD, (age 72 y+), Adjuvanted, 0.5mL 2020, 2022    Influenza, High Dose (Fluzone 65 yrs and older) 10/30/2017, 2018    Influenza, Triv, inactivated, subunit, adjuvanted, IM (Fluad 65 yrs and older) 10/28/2019    Pneumococcal Conjugate 13-valent (Tgdiggv20) 10/03/2016    Pneumococcal Polysaccharide (Acsytleco92) 10/12/2017    Td (Adult), 2 Lf Tetanus Toxoid, Pf (Td, Absorbed) 09/17/2015    Tetanus 05/27/1999    Zoster Live (Zostavax) 10/11/2016       Active Problems:  Patient Active Problem List   Diagnosis Code    Seborrheic keratoses L82.1    Adenomatous colon polyp D12.6    Hypertension I10    Hypercholesteremia E78.00    Osteopenia M85.80    History of nonmelanoma skin cancer Z85.828    Elevated BP without diagnosis of hypertension R03.0    Intractable nausea and vomiting R11.2    Malignant neoplasm of head of pancreas ([address] C25.0    Sepsis due to Klebsiella pneumoniae (HCC) A41.4       Isolation/Infection:   Isolation            No Isolation          Patient Infection Status       Infection Onset Added Last Indicated Last Indicated By Review Planned Expiration Resolved Resolved By    None active    Resolved    COVID-19 (Rule Out) 01/12/23 01/12/23 01/12/23 COVID-19, Rapid (Ordered)   01/12/23 Rule-Out Test Resulted            Nurse Assessment:  Last Vital Signs: BP (!) 143/76   Pulse 89   Temp 98.6 °F (37 °C) (Oral)   Resp 18   Ht 5' 4\" (1.626 m)   Wt 139 lb 15.9 oz (63.5 kg)   LMP  (LMP Unknown)   SpO2 95%   BMI 24.03 kg/m²     Last documented pain score (0-10 scale):    Last Weight:   Wt Readings from Last 1 Encounters:   01/14/23 139 lb 15.9 oz (63.5 kg)     Mental Status:  oriented    IV Access:  - None    Nursing Mobility/ADLs:  Walking   Independent  Transfer  Independent  Bathing  Independent  Dressing  Independent  Toileting  Independent  Feeding  Independent  Med Admin  Independent  Med Delivery   whole    Wound Care Documentation and Therapy:        Elimination:  Continence:    Bowel: Yes  Bladder: Yes  Urinary Catheter: None   Colostomy/Ileostomy/Ileal Conduit: No       Date of Last BM: ***  No intake or output data in the 24 hours ending 23 1055  No intake/output data recorded.     Safety Concerns:     [address] Safety Concerns:277912123}    Impairments/Disabilities:      [address] [facility] Impairments/Disabilities:733726096}    Nutrition Therapy:  Current Nutrition Therapy:   [address] [facility] Diet List:290218699}    Routes of Feeding: {CHP DME Other Feedings:820989555}  Liquids: {Slp liquid thickness:88488}  Daily Fluid Restriction: {CHP DME Yes amt example:525859708}  Last Modified Barium Swallow with Video (Video Swallowing Test): {Done Not Done UFTQ:623331806}    Treatments at the Time of Hospital Discharge:   Respiratory Treatments: ***  Oxygen Therapy:  {Therapy; copd oxygen:58382}  Ventilator:    { CC Vent UWHO:132734698}    Rehab Therapies: {THERAPEUTIC INTERVENTION:2925309153}  Weight Bearing Status/Restrictions: [address]  Weight Bearin}  Other Medical Equipment (for information only, NOT a DME order):  {EQUIPMENT:043942213}  Other Treatments: ***    Patient's personal belongings (please select all that are sent with patient):  {[facility] DME Belongings:536821999}    RN SIGNATURE:  Electronically signed by Ayde Yates RN on 23 at 10:56 AM EST    CASE MANAGEMENT/SOCIAL WORK SECTION    Inpatient Status Date: ***    Readmission Risk Assessment Score:  Readmission Risk              Risk of Unplanned Readmission:  13           Discharging to Facility/ Agency   Name:   Address:  Phone:  Fax:    Dialysis Facility (if applicable)   Name:  Address:  Dialysis Schedule:  Phone:  Fax:    / signature: {Esignature:757989766}    PHYSICIAN SECTION    Prognosis: {Prognosis:1421239507}    Condition at Discharge: [address] Patient Condition:734491379}    Rehab Potential (if transferring to Rehab): {Prognosis:6498222277}    Recommended Labs or Other Treatments After Discharge: ***    Physician Certification: I certify the above information and transfer of Uzma Quintero  is necessary for the continuing treatment of the diagnosis listed and that she requires {Admit to Appropriate Level of Care:21980} for {GREATER/LESS:833553333} 30 days.      Update Admission H&P: {CHP DME Changes in [facility]:452882107}    PHYSICIAN SIGNATURE:  {Esignature:450595294}

## 2023-01-16 NOTE — CARE COORDINATION
CASE MANAGEMENT DISCHARGE SUMMARY: Discharge order noted. Patient returning to home with spouse. No needs identified.      DISCHARGE DATE: 1/16/23    DISCHARGED TO HOME     TRANSPORTATION: Spouse             TIME: Morning     PREFERRED PHARMACY: Meeta MENA, RN  Case Management  [phone]             Electronically signed by Sima Rain RN on 1/16/2023 at 11:37 AM

## 2023-01-16 NOTE — PROGRESS NOTES
Reviewed discharge packet with patient and IV removed without complications  Patient discharged home with . No further needs at this time.

## 2023-01-17 ENCOUNTER — CARE COORDINATION (OUTPATIENT)
Dept: CASE MANAGEMENT | Age: 78
End: 2023-01-17

## 2023-01-17 DIAGNOSIS — R11.2 INTRACTABLE NAUSEA AND VOMITING: Primary | ICD-10-CM

## 2023-01-17 PROCEDURE — 1111F DSCHRG MED/CURRENT MED MERGE: CPT | Performed by: INTERNAL MEDICINE

## 2023-01-17 NOTE — CARE COORDINATION
[facility] Care Transitions Initial Follow Up Call    Call within 2 business days of discharge: Yes    Care Transition Nurse contacted the patient by telephone to perform post hospital discharge assessment. Verified name and  with patient as identifiers. Provided introduction to self, and explanation of the Care Transition Nurse role. Patient: Srinivasan Stevens Patient : 1945   MRN: [id number]  Reason for Admission: N/V, Klebs pn  Discharge Date: 23 RARS: Readmission Risk Score: 11.1      Last Discharge  Street       Date Complaint Diagnosis Description Type Department Provider    23 Fever; Nausea Fever, unspecified fever cause . .. ED to Hosp-Admission (Discharged) ([facility]) Sara Cochran MD            Was this an external facility discharge? No Discharge Facility: [facility] to be reviewed by the provider   Additional needs identified to be addressed with provider: No  none               Method of communication with provider: none. Discussed with pt her concerns about low fat diet, provided information on what was ordered as inpatient and how to read a nutrition label and did phone teaching on food products in her home. Ppt has f/u appt with  tomorrow who is the surgeon in regards to her pancreatic cancer. She has [facility] f/u on 23 that is a rescheduled from  due to hospitalization but is awaiting a call back for a sooner appte with Dr. Mando Johnson or another [facility] provider. Pt states feeling well today. Anxiously waiting her treatment plan. Care Transition Nurse reviewed discharge instructions with patient who verbalized understanding. The patient was given an opportunity to ask questions and does not have any further questions or concerns at this time. Were discharge instructions available to patient? Yes. Reviewed appropriate site of care based on symptoms and resources available to patient including: PCP  Specialist  When to call 911.  The patient agrees to contact the PCP office for questions related to their healthcare. Advance Care Planning:   Does patient have an Advance Directive: reviewed and current. Medication reconciliation was performed with patient, who verbalizes understanding of administration of home medications. Medications reviewed, 1111F entered: yes    Was patient discharged with a pulse oximeter? no    Non-face-to-face services provided:  Obtained and reviewed discharge summary and/or continuity of care documents    Offered patient enrollment in the Remote Patient Monitoring (RPM) program for in-home monitoring:  due to complexity of call did not offer today . Care Transitions 24 Hour Call    Schedule Follow Up Appointment with PCP: Completed  Care Transitions Interventions    Specialty Service Referral: Completed             Follow Up  Future Appointments   Date Time Provider Wally Choe   1/23/2023  2:40 PM Heather Porras MD [facility] Transition Nurse provided contact information. Plan for follow-up call in 3-5 days based on severity of symptoms and risk factors. Plan for next call: symptom management-related to hospitalization and GI  follow-up appointment-was she able to resolve appt with [facility] to be sooner than 2/1/23?   Offer RPM    Monie Belle RN

## 2023-01-17 NOTE — DISCHARGE SUMMARY
Physician Discharge Summary     Patient ID:   Tami Luis  [id number]  89 y.o.  1945    Admit date: 1/12/2023    Discharge date and time: 1/16/2023 11:32 AM     Admitting Physician: Lea Cannon MD     Discharge Physician: Kamilah Benítez MD    Discharge Diagnoses:   Bacteremia due to [facility] pn  Pancreatic cancer  Biliary stent  HTN      Discharged Condition: fair      Hospital Course: 66 yo female presented with rigors and fever over 102. Cultured up and started on cipro and flagyl. She ended up growing Klebs pn in her bc's (identified by pcr). She was changed to cipro only and felt fine. Dr Kinza Snyder of gi followed patient and advised treating her for seven days with cipro. His plan was to replace her stent if the fever recurred after finishing treatment. She was to be seeing her surgeon at [facility] 2 days after discharge to plan upcoming resection. She may need ctx prior to the resection.      Consults:   IP CONSULT TO GI    Discharge Exam:  General Appearance: alert and oriented to person, place and time, well-developed and well-nourished, in no acute distress  Skin: warm and dry, no rash or erythema  Head: normocephalic and atraumatic  Eyes: conjunctivae normal and sclera anicteric  ENT: hearing grossly normal bilaterally and sinuses non-tender  Neck: neck supple and non tender without mass, no thyromegaly or thyroid nodules, no cervical lymphadenopathy   Pulmonary/Chest: clear to auscultation bilaterally- no wheezes, rales or rhonchi, normal air movement, no respiratory distress  Cardiovascular: normal rate, normal S1 and S2, no gallops and no carotid bruits  Abdomen: soft, non-tender, non-distended, normal bowel sounds, no masses or organomegaly  Extremities: no cyanosis, clubbing or edema  Musculoskeletal: no swollen joints and no tender joints,  Neurologic: coordination normal and speech normal, moves all 4 extremities    Disposition: home  Hospital Meds:  No current facility-administered medications for this encounter.      Current Outpatient Medications   Medication Sig Dispense Refill    ciprofloxacin (CIPRO) 500 MG tablet Take 1 tablet by mouth 2 times daily for 7 days 14 tablet 0    saccharomyces boulardii (FLORASTOR) 250 MG capsule Take 1 capsule by mouth 2 times daily for 7 days 14 capsule 0    ondansetron (ZOFRAN) 8 MG tablet Take 8 mg by mouth every 8 hours as needed for Nausea or Vomiting      lactulose (CHRONULAC) 10 GM/15ML solution Take 10 g by mouth daily      cetirizine (ZYRTEC) 10 MG tablet Take 10 mg by mouth daily         Take Home Meds:  Discharge Medication List as of 1/16/2023 10:57 AM        START taking these medications    Details   ciprofloxacin (CIPRO) 500 MG tablet Take 1 tablet by mouth 2 times daily for 7 days, Disp-14 tablet, R-0Normal      saccharomyces boulardii (FLORASTOR) 250 MG capsule Take 1 capsule by mouth 2 times daily for 7 days, Disp-14 capsule, R-0Normal           CONTINUE these medications which have NOT CHANGED    Details   ondansetron (ZOFRAN) 8 MG tablet Take 8 mg by mouth every 8 hours as needed for Nausea or VomitingHistorical Med      lactulose (CHRONULAC) 10 GM/15ML solution Take 10 g by mouth dailyHistorical Med      cetirizine (ZYRTEC) 10 MG tablet Take 10 mg by mouth dailyHistorical Med           STOP taking these medications       atorvastatin (LIPITOR) 20 MG tablet Comments:   Reason for Stopping:         Calcium Carbonate-Vitamin D (CALCIUM-VITAMIN D3 PO) Comments:   Reason for Stopping:         Multiple Vitamins-Minerals (MULTIVITAMIN ADULT PO) Comments:   Reason for Stopping:                 Activity: activity as tolerated  Diet: regular diet  Wound Care: none needed    Follow-up with Kaveh Blackburn,    Time Spent: over 35 minutes spent performing hospital discharge  Signed:  Humaira Saldaña MD  1/16/2023  7:50 PM

## 2023-01-20 ENCOUNTER — CARE COORDINATION (OUTPATIENT)
Dept: CASE MANAGEMENT | Age: 78
End: 2023-01-20

## 2023-01-20 NOTE — CARE COORDINATION
[facility] Care Transitions Follow Up Call       Patient: [facility]  Patient : 1945   MRN: [id number]  Reason for Admission: Fever  Discharge Date: 23 RARS: Readmission Risk Score: 11.1      Needs to be reviewed by the provider   Additional needs identified to be addressed with provider: No               Method of communication with provider: none. Unable to reach patient for CT follow up phone call. Left message. Contact info provided. Requested return call to CTN.       Follow Up  Future Appointments   Date Time Provider Wally Choe   2023  2:40 PM Delila Alpers, MD \[facility]\"" [place] - [place]          Care Transitions Subsequent and Final Call    Subsequent and Final Calls  Care Transitions Interventions    Specialty Service Referral: Completed    Other Interventions:              Ike Gonzalez RN BSN  Care Transition Nurse  [phone]

## 2023-01-25 ENCOUNTER — CARE COORDINATION (OUTPATIENT)
Dept: CASE MANAGEMENT | Age: 78
End: 2023-01-25

## 2023-01-25 NOTE — CARE COORDINATION
[facility] Care Transitions Follow Up Call    Care Transition Nurse contacted the patient by telephone to follow up after admission on 2023. Verified name and  with patient as identifiers. Patient: Bibiana Mariee  Patient : 1945   MRN: [id number]  Reason for Admission: fever  Discharge Date: 23 RARS: Readmission Risk Score: 11.1      Needs to be reviewed by the provider   Additional needs identified to be addressed with provider: Yes  Procedure - port placement             Method of communication with provider: phone. Agustina Magdaleno states she is \"feeling ok\". She states her life has been turned upside and she is amazed at how many physicians she is seeing that are involved in her care. Agustina Magdaleno states she starts her chemo on 2023. She states her biggest concern at this time is finding out when her port will be placed - she states she has not heard from anyone. Agustina Magdaleno states she sees  at [facility]. Writer offered to contact [facility] to check on port placement. Agustina Magdaleno was grateful. Call placed to [facility]. Spoke with Jeffrey Chacon at reception. She took a message. Attempting to find out the status of Agustina Magdaleno getting her port placed. Await a return call. CT team will follow. Care Transitions Subsequent and Final Call    Subsequent and Final Calls  Care Transitions Interventions    Specialty Service Referral: Completed    Other Interventions:             Care Transition Nurse provided contact information for future needs. Plan for next call:  check on port placement.     Bijan Farmer RN BSN  Care Transition Nurse  [phone]

## 2023-01-26 ENCOUNTER — TELEPHONE (OUTPATIENT)
Dept: INTERVENTIONAL RADIOLOGY/VASCULAR | Age: 78
End: 2023-01-26

## 2023-01-26 ENCOUNTER — CARE COORDINATION (OUTPATIENT)
Dept: CASE MANAGEMENT | Age: 78
End: 2023-01-26

## 2023-01-26 NOTE — CARE COORDINATION
Received incoming call from [facility] last evening. Spoke with Lenin Mcgrath. She states the order was sent to IR at [address] [facility] states they should be reaching out to [facility] to schedule for 01/27 or 01/30. Call placed to [facility] this morning - Informed her of the information received from [facility]. Padmini Abjustin was grateful. CT team will follow.     Alejandro Salgado RN BSN  Care Transition Nurse  [phone]

## 2023-01-26 NOTE — TELEPHONE ENCOUNTER
Pt ready  for port to be rescheduled. Spoke with the patient for scheduling, the following was discussed:    -Will arrive at 0800 for procedure at 0930  -NPO after midnight  -Will have a  home  -Does not take any blood thinners. Verbalized understanding of instructions.      Electronically signed by Viola Nolan RN on 1/26/2023 at 3:18 PM

## 2023-01-26 NOTE — CARE COORDINATION
Received return call from Leandra. She left a voicemail stating she has not heard anything about her port placement.    Writer placed call to [facility]. Spoke with Veronica. She states Leandra is on her list to call today once the cases are completed.    Call placed to Leandra. Informed her that Veronica will be contacting her this afternoon. Leandra verbalized understanding. Leandra then verbalized concern about the following: She has her first chemo with [facility] on 01/31/2023.  called to schedule her for a CT scan on 02/02/2023. She is concerned about the closeness to her first chemo.    Writer placed call to [facility]. Left message with reception. Requesting info as to if this is okay for Leandra to have a CT scan on 02/02/2023 after her first chemo on 01/31/2023. Awaiting a return call. CT team will follow.    Lucina Gonzalez RN BSN  Care Transition Nurse  [phone]

## 2023-01-27 ENCOUNTER — CARE COORDINATION (OUTPATIENT)
Dept: CASE MANAGEMENT | Age: 78
End: 2023-01-27

## 2023-01-27 NOTE — CARE COORDINATION
Received voicemail from [place] at [facility] last evening - she stated it would be fine for [facility] to have the CT scan 2 days after her first chemo. She also stated she would notify the patient. Received voicemail from [facility] that she heard from [facility] IR as well as [facility]. She was grateful for the assistance. Call placed to [facility] this morning. Discussed next weeks appointments. 01/30 - port placement, 01/31 - first chemo, 02/02 - CT scan at [facility]. CT team will follow. [facility] denies any needs at this time.     Taylor Vinson RN BSN  Care Transition Nurse  [phone]

## 2023-01-30 ENCOUNTER — HOSPITAL ENCOUNTER (OUTPATIENT)
Dept: INTERVENTIONAL RADIOLOGY/VASCULAR | Age: 78
Discharge: HOME | End: 2023-01-30
Payer: MEDICARE

## 2023-01-30 VITALS
RESPIRATION RATE: 18 BRPM | OXYGEN SATURATION: 98 % | HEIGHT: 64 IN | HEART RATE: 77 BPM | DIASTOLIC BLOOD PRESSURE: 82 MMHG | BODY MASS INDEX: 23.05 KG/M2 | SYSTOLIC BLOOD PRESSURE: 136 MMHG | WEIGHT: 135 LBS | TEMPERATURE: 97.7 F

## 2023-01-30 DIAGNOSIS — C25.1 PRIMARY CANCER OF BODY OF PANCREAS (HCC): ICD-10-CM

## 2023-01-30 LAB
BASOPHILS ABSOLUTE: 0.1 K/UL (ref 0–0.2)
BASOPHILS RELATIVE PERCENT: 1.2 %
EOSINOPHILS ABSOLUTE: 0.3 K/UL (ref 0–0.6)
EOSINOPHILS RELATIVE PERCENT: 4.4 %
HCT VFR BLD CALC: 38.3 % (ref 36–48)
HEMOGLOBIN: 12.5 G/DL (ref 12–16)
INR BLD: 1 (ref 0.87–1.14)
LYMPHOCYTES ABSOLUTE: 1.7 K/UL (ref 1–5.1)
LYMPHOCYTES RELATIVE PERCENT: 24.6 %
MCH RBC QN AUTO: 29.2 PG (ref 26–34)
MCHC RBC AUTO-ENTMCNC: 32.6 G/DL (ref 31–36)
MCV RBC AUTO: 89.5 FL (ref 80–100)
MONOCYTES ABSOLUTE: 0.8 K/UL (ref 0–1.3)
MONOCYTES RELATIVE PERCENT: 11 %
NEUTROPHILS ABSOLUTE: 4.1 K/UL (ref 1.7–7.7)
NEUTROPHILS RELATIVE PERCENT: 58.8 %
PDW BLD-RTO: 14.5 % (ref 12.4–15.4)
PLATELET # BLD: 218 K/UL (ref 135–450)
PMV BLD AUTO: 10.1 FL (ref 5–10.5)
PROTHROMBIN TIME: 13.1 SEC (ref 11.7–14.5)
RBC # BLD: 4.28 M/UL (ref 4–5.2)
WBC # BLD: 6.9 K/UL (ref 4–11)

## 2023-01-30 PROCEDURE — 85025 COMPLETE CBC W/AUTO DIFF WBC: CPT

## 2023-01-30 PROCEDURE — 7100000011 HC PHASE II RECOVERY - ADDTL 15 MIN

## 2023-01-30 PROCEDURE — 85610 PROTHROMBIN TIME: CPT

## 2023-01-30 PROCEDURE — 2500000003 HC RX 250 WO HCPCS: Performed by: STUDENT IN AN ORGANIZED HEALTH CARE EDUCATION/TRAINING PROGRAM

## 2023-01-30 PROCEDURE — 76937 US GUIDE VASCULAR ACCESS: CPT

## 2023-01-30 PROCEDURE — C1788 PORT, INDWELLING, IMP: HCPCS

## 2023-01-30 PROCEDURE — 6360000002 HC RX W HCPCS: Performed by: STUDENT IN AN ORGANIZED HEALTH CARE EDUCATION/TRAINING PROGRAM

## 2023-01-30 PROCEDURE — 99152 MOD SED SAME PHYS/QHP 5/>YRS: CPT

## 2023-01-30 PROCEDURE — 7100000010 HC PHASE II RECOVERY - FIRST 15 MIN

## 2023-01-30 PROCEDURE — 2580000003 HC RX 258: Performed by: RADIOLOGY

## 2023-01-30 PROCEDURE — 2500000003 HC RX 250 WO HCPCS

## 2023-01-30 PROCEDURE — 77001 FLUOROGUIDE FOR VEIN DEVICE: CPT

## 2023-01-30 PROCEDURE — 36561 INSERT TUNNELED CV CATH: CPT

## 2023-01-30 PROCEDURE — 36415 COLL VENOUS BLD VENIPUNCTURE: CPT

## 2023-01-30 RX ORDER — MIDAZOLAM HYDROCHLORIDE 1 MG/ML
INJECTION INTRAMUSCULAR; INTRAVENOUS DAILY PRN
Status: COMPLETED | OUTPATIENT
Start: 2023-01-30 | End: 2023-01-30

## 2023-01-30 RX ORDER — CLINDAMYCIN PHOSPHATE 900 MG/50ML
900 INJECTION INTRAVENOUS ONCE
Status: COMPLETED | OUTPATIENT
Start: 2023-01-30 | End: 2023-01-30

## 2023-01-30 RX ORDER — FENTANYL CITRATE 50 UG/ML
INJECTION, SOLUTION INTRAMUSCULAR; INTRAVENOUS DAILY PRN
Status: COMPLETED | OUTPATIENT
Start: 2023-01-30 | End: 2023-01-30

## 2023-01-30 RX ORDER — SODIUM CHLORIDE 9 MG/ML
INJECTION, SOLUTION INTRAVENOUS ONCE
Status: COMPLETED | OUTPATIENT
Start: 2023-01-30 | End: 2023-01-30

## 2023-01-30 RX ORDER — ONDANSETRON 2 MG/ML
4 INJECTION INTRAMUSCULAR; INTRAVENOUS EVERY 8 HOURS PRN
Status: DISCONTINUED | OUTPATIENT
Start: 2023-01-30 | End: 2023-01-31 | Stop reason: HOSPADM

## 2023-01-30 RX ADMIN — CLINDAMYCIN PHOSPHATE 900 MG: 900 INJECTION, SOLUTION INTRAVENOUS at 09:22

## 2023-01-30 RX ADMIN — MIDAZOLAM 1 MG: 1 INJECTION INTRAMUSCULAR; INTRAVENOUS at 09:40

## 2023-01-30 RX ADMIN — SODIUM CHLORIDE: 9 INJECTION, SOLUTION INTRAVENOUS at 08:25

## 2023-01-30 RX ADMIN — FENTANYL CITRATE 50 MCG: 50 INJECTION INTRAMUSCULAR; INTRAVENOUS at 09:35

## 2023-01-30 RX ADMIN — MIDAZOLAM 1 MG: 1 INJECTION INTRAMUSCULAR; INTRAVENOUS at 09:36

## 2023-01-30 ASSESSMENT — PAIN - FUNCTIONAL ASSESSMENT: PAIN_FUNCTIONAL_ASSESSMENT: NONE - DENIES PAIN

## 2023-01-30 NOTE — PROGRESS NOTES
Resting quietly. Tolerating oral intake. Awaiting arrival of . No complaints. Ice pack to right upper chest area.

## 2023-01-30 NOTE — DISCHARGE INSTRUCTIONS
Genaro Cardona  [address]  Telephone: [phone]      PATIENT NAME: [facility] RECORD NUMBER:  [id number]  TODAY'S DATE: [de-identified]        PORTACATH DISCHARGE INSTRUCTIONS    Ladies you need to wear a sports bra or a really good support bra for at least the first  week. If you are large breasted you need to always wear a good support bra during the night as well. If you have a dressing in place, do not remove for two days. If there is no dressing, that means we used a liquid bandage underneath your steri strips. A little oozing is expected, if it continues contact Dr. Betty Brady MD.      No lifting over 5 lbs for the first 4 days and nothing over 10lbs for the next three days. Also limit your overhead arm movement and pulling for 3-4 days. You have dissolvable sutures in place so they will not need to be removed. The little steri strips will fall off over the next 2-3 weeks - do not remove them, just let them fall off. You may shower starting tomorrow, just dry off your incision area with a towel when you are done. Do not submerge the incision in water until the steri strips are completely gone. 8.     You should contact Dr. Betty Brady MD if you experience any of the following: Fever          Greater than 100, any pus or drainage from the incision, any redness,warmth           Or swelling at the site. 9.     Always keep your port ID card with you. 10.   If you take blood thinners,including Aspirin,  you may resume them tomorrow, do not take them today.

## 2023-01-30 NOTE — BRIEF OP NOTE
Brief Postoperative Note    Maxine Teague  YOB: 1945  [id number]      Pre-operative Diagnosis: Pancreatic cancer    Post-operative Diagnosis: Same    Procedure: Chest port placement    Anesthesia: Local and Moderate Sedation    Surgeons/Assistants: Ardeth Peabody, DO    Estimated Blood Loss: less than 50     Complications: None    Specimens: Was Not Obtained    Findings:   Successful placement of a chest port via the right IJ vein. Port ready for immediate use.        Ardeth Peabody, DO  1/30/2023, 10:07 AM  Interventional Radiology  [phone])

## 2023-01-30 NOTE — PROGRESS NOTES
Received from Radiology post [facility] placement. Admitted to Phase 2 care. Awake and alert, respirations easy and even. Oriented to room and surroundings. No complaints. Right upper chest incision intact with steri strips in place. No edema, no drainage noted.

## 2023-01-30 NOTE — PRE SEDATION
Sedation Pre-Procedure Note    Patient Name: Matthieu Ibarra   YOB: 1945  Room/Bed: Room/bed info not found  Medical Record Number: [id number]  Date: 1/30/2023   Time: 9:33 AM       Indication:  Pancreatic cancer, chest port placement    Consent: I have discussed with the patient and/or the patient representative the indication, alternatives, and the possible risks and/or complications of the planned procedure and the anesthesia methods. The patient and/or patient representative appear to understand and agree to proceed. Vital Signs:   Vitals:    01/30/23 0818   BP: 135/84   Pulse: 87   Resp: 14   Temp: 97 °F (36.1 °C)   SpO2: 97%       Past Medical History:   has a past medical history of Adenomatous colon polyp, adolescent scoliosis, Basal cell carcinoma of skin, Breast CA ([facility] [address] Breast cancer ([facility] [address] Chronic throat pain, Elevated BP without diagnosis of hypertension, High cholesterol, Osteopenia after menopause, and Prolonged emergence from general anesthesia. Past Surgical History:   has a past surgical history that includes Breast lumpectomy (Right, 12/06/2010); Hysterectomy, total abdominal; Cholecystectomy, laparoscopic; Tonsillectomy; Appendectomy; ERCP (N/A, 12/07/2022); Upper gastrointestinal endoscopy (N/A, 12/07/2022); and ERCP (N/A, 12/07/2022). Medications:   Scheduled Meds:    clindamycin (CLEOCIN) IV  900 mg IntraVENous Once     Continuous Infusions:   PRN Meds:   Home Meds:   Prior to Admission medications    Medication Sig Start Date End Date Taking?  Authorizing Provider   ondansetron (ZOFRAN) 8 MG tablet Take 8 mg by mouth every 8 hours as needed for Nausea or Vomiting    Historical Provider, MD     Coumadin Use Last 7 Days:  no  Antiplatelet drug therapy use last 7 days: no  Other anticoagulant use last 7 days: no  Additional Medication Information:  N/A      Pre-Sedation Documentation and Exam:   I have personally completed a history, physical exam & review of systems for this patient (see notes).     Mallampati Airway Assessment:  normal, Mallampati Class II - (soft palate, fauces & uvula are visible)    Prior History of Anesthesia Complications:   none    ASA Classification:  Class 2 - A normal healthy patient with mild systemic disease    Sedation/ Anesthesia Plan:   intravenous sedation    Medications Planned:   midazolam (Versed) intravenously and fentanyl intravenously    Patient is an appropriate candidate for plan of sedation: yes    Electronically signed by Nadira Soriano DO on 1/30/2023 at 9:33 AM

## 2023-01-30 NOTE — PROGRESS NOTES
Tolerating being up in chair. Discharge instructions given to patient and . Verbalize understanding. No complaints. Incision area without bleeding, bruising or edema. Steri strips intact.

## 2023-02-03 ENCOUNTER — CARE COORDINATION (OUTPATIENT)
Dept: CASE MANAGEMENT | Age: 78
End: 2023-02-03

## 2023-02-03 NOTE — CARE COORDINATION
[facility] Transitions Follow Up Call    2/3/2023    Patient: Alessandra Esteban  Patient : 1945   MRN: [id number]  Reason for Admission: Fever  Discharge Date: 23 RARS: Readmission Risk Score: 11.1         Spoke with: Louisa Cuevas  Patient reports she is doing fine. Denies nvd, cp, fever, chills cough or sob. Eats several small meals a day. Taking in fluids well. No bowel or bladder elimination problems. She has had her first chemo treatment. No questions or needs voiced. Will continue to follow. Care Transitions Follow Up Call    Needs to be reviewed by the provider   Additional needs identified to be addressed with provider: No  none             Method of communication with provider : none      Care Transition Nurse (CTN) contacted the patient by telephone to follow up after admission on 2023. Verified name and  with patient as identifiers. Addressed changes since last contact: none  Discussed follow-up appointments. If no appointment was previously scheduled, appointment scheduling offered: No.   Is follow up appointment scheduled within 7 days of discharge? Yes. Advance Care Planning:   Does patient have an Advance Directive: reviewed and current. Patients top risk factors for readmission: ineffective coping  medical condition-   Interventions to address risk factors: Education of patient/family/caregiver/guardian to support self-management-       Non-[facility] follow up appointment(s):     CTN provided contact information for future needs. Plan for follow-up call in 5-7 days based on severity of symptoms and risk factors.   Plan for next call: self management-           Care Transitions Subsequent and Final Call    Subsequent and Final Calls  Do you have any ongoing symptoms?: No  Have your medications changed?: No  Do you have any questions related to your medications?: No  Do you currently have any active services?: No  Do you have any needs or concerns that I can assist you with?: No  Identified Barriers: None  Care Transitions Interventions    Specialty Service Referral: Completed    Other Interventions: Follow Up  No future appointments.     Dimitris Tolliver LPN

## 2023-02-10 ENCOUNTER — CARE COORDINATION (OUTPATIENT)
Dept: CASE MANAGEMENT | Age: 78
End: 2023-02-10

## 2023-02-10 NOTE — CARE COORDINATION
[facility] Care Transitions Follow Up Call    Patient Current Location:  Home: [place] [address]    Care Transition Nurse contacted the patient by telephone to follow up after admission on 23. Verified name and  with patient as identifiers. Patient: George Borja  Patient : 1945   MRN: [id number]  Reason for Admission: Intractable Nausea and Vomiting  Discharge Date: 23 RARS: Readmission Risk Score: 11.1      Needs to be reviewed by the provider   Additional needs identified to be addressed with provider: No  none             Method of communication with provider: none. Patient states she is doing pretty good. States she is tired. Patient encouraged to listen to her body. Rest without guilt. Reports concern for red spot above L ankle. States it burns, puffy and tender to the touch. States she addressed with NP on  during a visit. States she was instructed to monitor. Patient concerned about ability to communicate moving into the weekend. Discussed on call options. States she has her 3rd chemo visit . States she has nausea. It required taking Zofran yesterday. It was effective. Did not need to take one today. States she has found eating small frequent meals helps. States she is drinking fluids. Elimination habits normal. CTN complimented on good mood and spirit. No other questions or concerns. Addressed changes since last contact:   Nausea/Fatigue  Discussed follow-up appointments. If no appointment was previously scheduled, appointment scheduling offered: Yes. Is follow up appointment scheduled within 7 days of discharge? Yes. Follow Up  No future appointments. Non-[facility] follow up appointment(s): N/A    Care Transition Nurse reviewed medical action plan with patient and discussed any barriers to care and/or understanding of plan of care after discharge.  Discussed appropriate site of care based on symptoms and resources available to patient including: PCP  Specialist  When to call 911. The patient agrees to contact the PCP office for questions related to their healthcare. Advance Care Planning:   reviewed and current. Patients top risk factors for readmission: medical condition-Pancreatic cancer  Interventions to address risk factors:  Rest, medications to address symptoms, keeping necessary appointments, treatments    Offered patient enrollment in the Remote Patient Monitoring (RPM) program for in-home monitoring: Yes, but did not enroll at this time. Care Transitions Subsequent and Final Call    Subsequent and Final Calls  Do you have any ongoing symptoms?: Yes  Patient-reported symptoms: Nausea  Have your medications changed?: No  Do you have any questions related to your medications?: No  Do you currently have any active services?: No  Do you have any needs or concerns that I can assist you with?: No  Care Transitions Interventions    Specialty Service Referral: Completed    Other Interventions:             Care Transition Nurse provided contact information for future needs. Plan for follow-up call in 5-7 days based on severity of symptoms and risk factors.   Plan for next call: symptom management-Nausea, fatigue    Bharti Cotton RN

## 2023-02-21 ENCOUNTER — HOSPITAL ENCOUNTER (OUTPATIENT)
Dept: VASCULAR LAB | Age: 78
Discharge: HOME | End: 2023-02-21
Payer: MEDICARE

## 2023-02-21 DIAGNOSIS — R60.0 LOWER EXTREMITY EDEMA: ICD-10-CM

## 2023-02-21 PROCEDURE — 93970 EXTREMITY STUDY: CPT

## 2023-04-20 ENCOUNTER — HOSPITAL ENCOUNTER (OUTPATIENT)
Dept: CT IMAGING | Age: 78
Discharge: HOME | End: 2023-04-20
Payer: MEDICARE

## 2023-04-20 DIAGNOSIS — C25.7 MALIGNANT NEOPLASM OF OTHER PARTS OF PANCREAS (HCC): ICD-10-CM

## 2023-04-20 LAB
PERFORMED ON: NORMAL
POC CREATININE: 0.9 MG/DL (ref 0.6–1.2)
POC SAMPLE TYPE: NORMAL

## 2023-04-20 PROCEDURE — 82565 ASSAY OF CREATININE: CPT

## 2023-04-20 PROCEDURE — 74177 CT ABD & PELVIS W/CONTRAST: CPT

## 2023-04-20 PROCEDURE — 6360000004 HC RX CONTRAST MEDICATION: Performed by: SURGERY

## 2023-04-20 RX ADMIN — IOPAMIDOL 75 ML: 755 INJECTION, SOLUTION INTRAVENOUS at 10:58

## 2023-04-20 RX ADMIN — IOPAMIDOL 50 ML: 612 INJECTION, SOLUTION INTRAVENOUS at 10:58

## 2023-08-30 ENCOUNTER — HOSPITAL ENCOUNTER (OUTPATIENT)
Dept: GENERAL RADIOLOGY | Age: 78
Discharge: HOME | End: 2023-08-30
Attending: INTERNAL MEDICINE
Payer: MEDICARE

## 2023-08-30 ENCOUNTER — HOSPITAL ENCOUNTER (OUTPATIENT)
Dept: VASCULAR LAB | Age: 78
Discharge: HOME | End: 2023-08-30
Attending: INTERNAL MEDICINE
Payer: MEDICARE

## 2023-08-30 ENCOUNTER — HOSPITAL ENCOUNTER (OUTPATIENT)
Age: 78
Discharge: HOME | End: 2023-08-30
Attending: INTERNAL MEDICINE
Payer: MEDICARE

## 2023-08-30 DIAGNOSIS — M79.89 SWELLING OF LIMB: ICD-10-CM

## 2023-08-30 PROCEDURE — 93970 EXTREMITY STUDY: CPT

## 2023-08-30 PROCEDURE — 71046 X-RAY EXAM CHEST 2 VIEWS: CPT

## 2023-09-26 ENCOUNTER — HOSPITAL ENCOUNTER (OUTPATIENT)
Dept: GENERAL RADIOLOGY | Age: 78
Discharge: HOME | End: 2023-09-26
Attending: INTERNAL MEDICINE
Payer: MEDICARE

## 2023-09-26 ENCOUNTER — HOSPITAL ENCOUNTER (OUTPATIENT)
Age: 78
Discharge: HOME | End: 2023-09-26
Payer: MEDICARE

## 2023-09-26 PROCEDURE — 71046 X-RAY EXAM CHEST 2 VIEWS: CPT

## (undated) DEVICE — ORGANIZER MED DEV W76XL86CM PCH W25XL28CM FOR ENDOSCP TBL

## (undated) DEVICE — GOWN SIRUS NONREIN LG W/TWL: Brand: MEDLINE INDUSTRIES, INC.

## (undated) DEVICE — SPHINCTEROTOME: Brand: JAGTOME RX 39

## (undated) DEVICE — ELECTRODE PT RET AD L9FT HI MOIST COND ADH HYDRGEL CORDED

## (undated) DEVICE — BITE BLOCK ENDOSCP AD 60 FR W/ ADJ STRP PLAS GRN BLOX

## (undated) DEVICE — Device

## (undated) DEVICE — ENDOSCOPIC ULTRASOUND FINE NEEDLE BIOPSY (FNB) DEVICE: Brand: ACQUIRE

## (undated) DEVICE — DEVICE LOK BILI BX CAP RAP EXCHG DISPOSABLE

## (undated) DEVICE — WIREGUIDED CYTOLOGY BRUSH: Brand: RX CYTOLOGY BRUSH

## (undated) DEVICE — ENDOSCOPY KIT: Brand: MEDLINE INDUSTRIES, INC.

## (undated) DEVICE — SINGLE USE DISTAL COVER MAJ-2315: Brand: SINGLE USE DISTAL COVER